# Patient Record
Sex: MALE | Race: BLACK OR AFRICAN AMERICAN | Employment: PART TIME | URBAN - METROPOLITAN AREA
[De-identification: names, ages, dates, MRNs, and addresses within clinical notes are randomized per-mention and may not be internally consistent; named-entity substitution may affect disease eponyms.]

---

## 2017-08-22 ENCOUNTER — HOSPITAL ENCOUNTER (EMERGENCY)
Age: 52
Discharge: HOME | End: 2017-08-22
Attending: EMERGENCY MEDICINE
Payer: COMMERCIAL

## 2017-08-22 ENCOUNTER — APPOINTMENT (OUTPATIENT)
Dept: GENERAL RADIOLOGY | Age: 52
End: 2017-08-22
Payer: COMMERCIAL

## 2017-08-22 VITALS
RESPIRATION RATE: 20 BRPM | WEIGHT: 234.57 LBS | TEMPERATURE: 97.8 F | SYSTOLIC BLOOD PRESSURE: 166 MMHG | DIASTOLIC BLOOD PRESSURE: 105 MMHG | HEIGHT: 73 IN | HEART RATE: 69 BPM | OXYGEN SATURATION: 99 % | BODY MASS INDEX: 31.09 KG/M2

## 2017-08-22 DIAGNOSIS — M25.571 RIGHT ANKLE PAIN, UNSPECIFIED CHRONICITY: ICD-10-CM

## 2017-08-22 DIAGNOSIS — M77.9 BONE SPUR: Primary | ICD-10-CM

## 2017-08-22 PROCEDURE — 99283 EMERGENCY DEPT VISIT LOW MDM: CPT

## 2017-08-22 PROCEDURE — 74011250637 HC RX REV CODE- 250/637: Performed by: PHYSICIAN ASSISTANT

## 2017-08-22 PROCEDURE — 73610 X-RAY EXAM OF ANKLE: CPT

## 2017-08-22 RX ORDER — OXYCODONE AND ACETAMINOPHEN 5; 325 MG/1; MG/1
1 TABLET ORAL
Status: COMPLETED | OUTPATIENT
Start: 2017-08-22 | End: 2017-08-22

## 2017-08-22 RX ORDER — HYDROCODONE BITARTRATE AND ACETAMINOPHEN 5; 325 MG/1; MG/1
1 TABLET ORAL
Qty: 12 TAB | Refills: 0 | Status: SHIPPED | OUTPATIENT
Start: 2017-08-22 | End: 2017-12-15 | Stop reason: ALTCHOICE

## 2017-08-22 RX ADMIN — OXYCODONE AND ACETAMINOPHEN 1 TABLET: 5; 325 TABLET ORAL at 19:13

## 2017-08-22 NOTE — ED NOTES
Discharge instructions reviewed with patient. Discharge instructions given to patient per Herber Clancy PA-C. Patient able to return/verbalize discharge instructions. Copy of discharge instructions provided. Patient condition stable, respiratory status within normal limits, neuro status intact. Ambulatory out of ER.

## 2017-08-22 NOTE — ED PROVIDER NOTES
HPI Comments: Ysabel Zuniga is a 47 y.o. male with PMhx significant for rotator cuff dysfunction who presents ambulatory to the ED with cc of gradually worsening right ankle pain x 4 weeks. He reports associated swelling. Pt states that he took motrin with no relief of his symptoms. He notes that he stands a lot at work which exacerbates his symptoms. Pt denies any foot pain, knee pain, CP, SOB, numbness, or weakness. Pt denies history of gout. Social history significant for: - Tobacco, - EtOH, - Illicit drug use    PCP: None    There are no other complaints, changes or physical findings at this time. Written by STEVE Khan, as dictated by Tiki King PA-C. The history is provided by the patient. No  was used. Past Medical History:   Diagnosis Date    Rotator cuff dysfunction 2013       History reviewed. No pertinent surgical history. History reviewed. No pertinent family history. Social History     Social History    Marital status:      Spouse name: N/A    Number of children: N/A    Years of education: N/A     Occupational History    Not on file. Social History Main Topics    Smoking status: Never Smoker    Smokeless tobacco: Never Used    Alcohol use No    Drug use: No    Sexual activity: Yes     Partners: Female     Other Topics Concern    Not on file     Social History Narrative         ALLERGIES: Tramadol; Ibuprofen; Keflex [cephalexin]; Morphine; and Pcn [penicillins]    Review of Systems   Constitutional: Negative. Negative for chills and fever. HENT: Negative. Negative for rhinorrhea and sore throat. Eyes: Negative. Negative for visual disturbance. Respiratory: Negative. Negative for cough, chest tightness, shortness of breath and wheezing. Cardiovascular: Negative. Negative for chest pain and palpitations. Gastrointestinal: Negative. Negative for abdominal pain, constipation, diarrhea, nausea and vomiting. Genitourinary: Negative. Negative for dysuria and hematuria. Musculoskeletal: Positive for arthralgias (right ankle) and joint swelling (right ankle). Negative for myalgias (right foot). - knee pain   Skin: Negative. Negative for rash. Allergic/Immunologic: Negative. Negative for environmental allergies and food allergies. Neurological: Negative. Negative for weakness, numbness and headaches. Psychiatric/Behavioral: Negative. Negative for suicidal ideas. Patient Vitals for the past 12 hrs:   Temp Pulse Resp BP SpO2   08/22/17 1753 97.8 °F (36.6 °C) 69 20 (!) 166/105 99 %     Physical Exam   Constitutional: He is oriented to person, place, and time. He appears well-developed and well-nourished. No distress. Pt is an AAF, awake and alert in NAD. HENT:   Head: Normocephalic and atraumatic. Right Ear: Tympanic membrane, external ear and ear canal normal.   Left Ear: Tympanic membrane, external ear and ear canal normal.   Nose: Nose normal.   Mouth/Throat: Uvula is midline, oropharynx is clear and moist and mucous membranes are normal.   Eyes: Conjunctivae and EOM are normal. Pupils are equal, round, and reactive to light. Right eye exhibits no discharge. Left eye exhibits no discharge. Neck: Normal range of motion. Cardiovascular: Normal rate, normal heart sounds and intact distal pulses. 2+ DP pulses   Pulmonary/Chest: Effort normal and breath sounds normal. No respiratory distress. He has no wheezes. He has no rales. He exhibits no tenderness. Abdominal: Soft. Bowel sounds are normal. There is no tenderness. There is no guarding. No CVA tenderness b/l. Musculoskeletal: Normal range of motion. He exhibits edema and tenderness. He exhibits no deformity. Mild edema of lateral malleolus with mild TTP. Edema appears symmetrical. No erythema or increased warmth. TYRA. No foot TTP. Neurological: He is alert and oriented to person, place, and time.  Coordination normal.   No focal neuro deficits. Skin: Skin is warm and dry. No rash noted. He is not diaphoretic. No erythema. No pallor. Psychiatric: He has a normal mood and affect. His behavior is normal.   Vitals reviewed. MDM  Number of Diagnoses or Management Options  Bone spur:   Right ankle pain, unspecified chronicity:   Diagnosis management comments:   DDx: OA, Sprain, strain, fracture, contusion, bone spur    PE findings not consistent with gout. Amount and/or Complexity of Data Reviewed  Tests in the radiology section of CPT®: ordered and reviewed  Review and summarize past medical records: yes    Patient Progress  Patient progress: stable    Procedures    Procedure Note - ACE Wrap Placement:  7:25 PM  Performed by: ROMAN Springer  Neurovascularly intact prior to tx. An ACE Wrap was placed on pt's right ankle. Joint was placed in neutral position. Neurovascularly intact after tx. The procedure took 1-15 minutes, and pt tolerated well. Written by ROMAN Springer, ED. IMAGING RESULTS:  XR ANKLE RT MIN 3 V   Final Result   EXAM:  XR ANKLE RT MIN 3 V     INDICATION:  R ankle pain.     COMPARISON: None.     FINDINGS: Three views of the right ankle demonstrate no fracture or disruption  of the ankle mortise. There is no other acute osseous or articular abnormality. The soft tissues are within normal limits. A plantar spur is noted. There is  also an exostosis projecting from the medial malleolus.     IMPRESSION  IMPRESSION: No acute abnormality. MEDICATIONS GIVEN:  Medications   oxyCODONE-acetaminophen (PERCOCET) 5-325 mg per tablet 1 Tab (1 Tab Oral Given 8/22/17 1913)       IMPRESSION:  1. Bone spur    2. Right ankle pain, unspecified chronicity        PLAN:  1. Current Discharge Medication List      CONTINUE these medications which have CHANGED    Details   HYDROcodone-acetaminophen (NORCO) 5-325 mg per tablet Take 1 Tab by mouth every six (6) hours as needed for Pain.  Max Daily Amount: 4 Tabs. Qty: 12 Tab, Refills: 0           2. Follow-up Information     Follow up With Details Comments Contact Info    Kory Esparza DPM Schedule an appointment as soon as possible for a visit in 2 days  [address] [place] [address]. [facility]  [address] [place] [address] [facility]  [phone]      [facility] EMERGENCY DEPT  As needed or, If symptoms worsen [address]   [phone] [id number] [phone]        3. RICE  Return to ED if worse     DISCHARGE NOTE  7:25 PM  The patient has been re-evaluated and is ready for discharge. Reviewed available results with patient. Counseled patient on diagnosis and care plan. Patient has expressed understanding, and all questions have been answered. Patient agrees with plan and agrees to follow up as recommended, or return to the ED if their symptoms worsen. Discharge instructions have been provided and explained to the patient, along with reasons to return to the ED. This note is prepared by Joya Booth, acting as Scribe for Gabriele Brooks PA-C. Gabriele Brooks PA-C: The scribe's documentation has been prepared under my direction and personally reviewed by me in its entirety. I confirm that the note above accurately reflects all work, treatment, procedures, and medical decision making performed by me. This note will not be viewable in [address]

## 2017-08-22 NOTE — ED NOTES
Assumed care of patient from triage. Patient reports right ankle pain x5 weeks. Patient denies any injury or trauma to extremity and states the pain \"just came out of nowhere. \" Patient states it is difficult to bear weight on extremity and has been increasingly painful since he has to stand on it all day at work. Patient is alert and oriented x4, respirations even and unlabored, vital signs stable. Pulses present in affected extremity, patient able to move extremity.

## 2017-12-14 ENCOUNTER — DOCUMENTATION ONLY (OUTPATIENT)
Dept: INTERNAL MEDICINE CLINIC | Age: 52
End: 2017-12-14

## 2017-12-14 NOTE — PROGRESS NOTES
Spoke With Vasu Camejo From [facility] [employer] in ref to insurance  not matching what we have on file, stated that they had 4/3/66 on file req that we fsax a copy of pts id card over that shows ,adv that card we have on file was ,due to pt never getting a renewed copy to us,rep stated that was ok to fax over the  copy stated that she would send it over to the member services dept as an urgent req to have the  changed before the pts appt on 12/15    Contact info: DELLA Phone: [phone] ext.  [phone] Fax: [phone]

## 2017-12-15 ENCOUNTER — OFFICE VISIT (OUTPATIENT)
Dept: INTERNAL MEDICINE CLINIC | Age: 52
End: 2017-12-15

## 2017-12-15 VITALS
BODY MASS INDEX: 30.48 KG/M2 | DIASTOLIC BLOOD PRESSURE: 88 MMHG | TEMPERATURE: 97.5 F | HEART RATE: 62 BPM | HEIGHT: 73 IN | OXYGEN SATURATION: 100 % | SYSTOLIC BLOOD PRESSURE: 148 MMHG | WEIGHT: 230 LBS | RESPIRATION RATE: 16 BRPM

## 2017-12-15 DIAGNOSIS — M54.5 CHRONIC LOW BACK PAIN, UNSPECIFIED BACK PAIN LATERALITY, WITH SCIATICA PRESENCE UNSPECIFIED: ICD-10-CM

## 2017-12-15 DIAGNOSIS — M54.40 ACUTE LEFT-SIDED LOW BACK PAIN WITH SCIATICA, SCIATICA LATERALITY UNSPECIFIED: Primary | ICD-10-CM

## 2017-12-15 DIAGNOSIS — G89.29 CHRONIC LOW BACK PAIN, UNSPECIFIED BACK PAIN LATERALITY, WITH SCIATICA PRESENCE UNSPECIFIED: ICD-10-CM

## 2017-12-15 LAB
BILIRUB UR QL STRIP: NEGATIVE
GLUCOSE UR-MCNC: NEGATIVE MG/DL
KETONES P FAST UR STRIP-MCNC: NEGATIVE MG/DL
PH UR STRIP: 5.5 [PH] (ref 4.6–8)
PROT UR QL STRIP: NEGATIVE
SP GR UR STRIP: 1.02 (ref 1–1.03)
UA UROBILINOGEN AMB POC: NORMAL (ref 0.2–1)
URINALYSIS CLARITY POC: CLEAR
URINALYSIS COLOR POC: NORMAL
URINE BLOOD POC: NORMAL
URINE LEUKOCYTES POC: NEGATIVE
URINE NITRITES POC: NEGATIVE

## 2017-12-15 RX ORDER — OXYCODONE AND ACETAMINOPHEN 5; 325 MG/1; MG/1
1 TABLET ORAL
Qty: 20 TAB | Refills: 0 | Status: SHIPPED | OUTPATIENT
Start: 2017-12-15 | End: 2018-03-30 | Stop reason: SDUPTHER

## 2017-12-15 RX ORDER — PREDNISONE 10 MG/1
TABLET ORAL
Qty: 20 TAB | Refills: 0 | Status: SHIPPED | OUTPATIENT
Start: 2017-12-15 | End: 2018-02-22 | Stop reason: ALTCHOICE

## 2017-12-15 NOTE — LETTER
NOTIFICATION RETURN TO WORK / SCHOOL 
 
12/15/2017 3:19 PM 
 
Mr. Batool Avalos [address] [address] To Whom It May Concern: 
 
Batool Avalos is currently under the care of [address] He will return to work/school on: 12/20/17 If there are questions or concerns please have the patient contact our office. Sincerely, Alex Pepper MD

## 2017-12-15 NOTE — PROGRESS NOTES
Chief Complaint   Patient presents with    Side Pain     pain L/side and travels down L/leg     I have reviewed the patient's medical history in detail and updated the computerized patient record. Health Maintenance reviewed. 1. Have you been to the ER, urgent care clinic since your last visit? Hospitalized since your last visit? yes    2. Have you seen or consulted any other health care providers outside of the [address] [place] [address] since your last visit? Include any pap smears or colon screening.  Yes    Encouraged pt to discuss pt's wishes with spouse/partner/family and bring them in the next appt to follow thru with the Advanced Directive

## 2017-12-15 NOTE — MR AVS SNAPSHOT
Visit Information Date & Time Provider Department Dept. Phone Encounter #  
 12/15/2017  2:00 PM Jose Antonio Morton MD [place] [address] [phone] [id number] Follow-up Instructions Return in about 4 days (around 12/19/2017) for routine follow up. Upcoming Health Maintenance Date Due DTaP/Tdap/Td series (1 - Tdap) 4/10/1987 FOBT Q 1 YEAR AGE 50-75 4/10/2016 Influenza Age 5 to Adult 8/1/2017 Allergies as of 12/15/2017  Review Complete On: 12/15/2017 By: Jose Antonio Morton MD  
  
 Severity Noted Reaction Type Reactions Tramadol High 01/20/2016    Anaphylaxis Ibuprofen  04/27/2014    Nausea Only Keflex [Cephalexin]  01/20/2016    Itching Morphine  04/27/2014    Hives Pcn [Penicillins]  01/20/2016    Angioedema Current Immunizations  Never Reviewed No immunizations on file. Not reviewed this visit You Were Diagnosed With   
  
 Codes Comments Acute left-sided low back pain with sciatica, sciatica laterality unspecified    -  Primary ICD-10-CM: M54.40 ICD-9-CM: 724.2, 724.3 Vitals BP Pulse Temp Resp Height(growth percentile) Weight(growth percentile) 148/88 (BP 1 Location: Left arm, BP Patient Position: Sitting) 62 97.5 °F (36.4 °C) (Oral) 16 6' 1\" (1.854 m) 230 lb (104.3 kg) SpO2 BMI Smoking Status 100% 30.34 kg/m2 Never Smoker Vitals History BMI and BSA Data Body Mass Index Body Surface Area  
 30.34 kg/m 2 2.32 m 2 Preferred Pharmacy Pharmacy Name Phone [facility], [address] [place] [address] [phone] Your Updated Medication List  
  
   
This list is accurate as of: 12/15/17  3:17 PM.  Always use your most recent med list.  
  
  
  
  
 oxyCODONE-acetaminophen 5-325 mg per tablet Commonly known as:  PERCOCET Take 1 Tab by mouth every eight (8) hours as needed for Pain. Max Daily Amount: 3 Tabs. predniSONE 10 mg tablet Commonly known as:  DELTASONE  
4 tabs for 2 day, 3 tabs for 2 days, 2 tabs for 2 days, 1tab for 2 days Prescriptions Printed Refills  
 oxyCODONE-acetaminophen (PERCOCET) 5-325 mg per tablet 0 Sig: Take 1 Tab by mouth every eight (8) hours as needed for Pain. Max Daily Amount: 3 Tabs. Class: Print Route: Oral  
  
Prescriptions Sent to Pharmacy Refills  
 predniSONE (DELTASONE) 10 mg tablet 0 Si tabs for 2 day, 3 tabs for 2 days, 2 tabs for 2 days, 1tab for 2 days Class: Normal  
 Pharmacy: [address], [address] [place] [address] #: [phone] We Performed the Following AMB POC URINALYSIS DIP STICK AUTO W/O MICRO [81273 CPT(R)] Follow-up Instructions Return in about 4 days (around 2017) for routine follow up. Introducing [facility] & HEALTH SERVICES! Liz Sanchez introduces [employer] patient portal. Now you can access parts of your medical record, email your doctor's office, and request medication refills online. 1. In your internet browser, go to [email or web address] 2. Click on the First Time User? Click Here link in the Sign In box. You will see the New Member Sign Up page. 3. Enter your Evocha Access Code exactly as it appears below. You will not need to use this code after youve completed the sign-up process. If you do not sign up before the expiration date, you must request a new code. · Ev[employer] Access Code: [email or web address] Expires: 3/15/2018  2:07 PM 
 
4. Enter the last four digits of your Social Security Number (xxxx) and Date of Birth (mm/dd/yyyy) as indicated and click Submit. You will be taken to the next sign-up page. 5. Create a [email or web address] ID. This will be your [email or web address] login ID and cannot be changed, so think of one that is secure and easy to remember. 6. Create a [email or web address] password. You can change your password at any time. 7. Enter your Password Reset Question and Answer. This can be used at a later time if you forget your password. 8. Enter your e-mail address. You will receive e-mail notification when new information is available in [address] 9. Click Sign Up. You can now view and download portions of your medical record. 10. Click the Download Summary menu link to download a portable copy of your medical information. If you have questions, please visit the Frequently Asked Questions section of the [facility] website. Remember, [facility] is NOT to be used for urgent needs. For medical emergencies, dial 911. Now available from your iPhone and Android! Please provide this summary of care documentation to your next provider. Your primary care clinician is listed as Franky Marcano. If you have any questions after today's visit, please call [phone].

## 2017-12-16 NOTE — PROGRESS NOTES
HISTORY OF PRESENT ILLNESS  Vito Collazo is a 47 y.o. male. Side Pain   The history is provided by the patient. This is a recurrent problem. The current episode started more than 1 week ago. The problem occurs constantly. The problem has been gradually worsening. Associated symptoms comments: Severe left flank pain. The symptoms are aggravated by bending. Nothing relieves the symptoms. Treatments tried: Has been to the emergency room twice, had workup has not shown a cause for  is pain was given some hydrocodone, and some muscle relaxers. The treatment provided no relief. Was told to follow-up here PCP, been seeing . Urine compliance done last year showed no illicit narcotics/drugs. Within the emergency room, had CT scan abdomen pelvis, no evidence of kidney stones present. Negative exam.  Labs from today were reviewed  yes, labs done previously were reviewed  yes, Labs done in ER were reviewed  yes, Additional labs are ordered  no,    Past Medical History:   Diagnosis Date    Depression     Headache     Rotator cuff dysfunction 2013     Social History     Social History    Marital status:      Spouse name: N/A    Number of children: 3    Years of education: N/A     Occupational History    break shoes      Social History Main Topics    Smoking status: Never Smoker    Smokeless tobacco: Never Used    Alcohol use No    Drug use: No    Sexual activity: Yes     Partners: Female     Other Topics Concern    Not on file     Social History Narrative    Living with wife and 3 kids     Allergies   Allergen Reactions    Tramadol Anaphylaxis    Ibuprofen Nausea Only    Keflex [Cephalexin] Itching    Morphine Hives    Pcn [Penicillins] Angioedema       Review of Systems   Constitutional: Negative for fever and weight loss. Gastrointestinal: Negative for blood in stool. No incontinence   Genitourinary: Positive for flank pain. Negative for dysuria and hematuria. Physical Exam  Visit Vitals    /88 (BP 1 Location: Left arm, BP Patient Position: Sitting)    Pulse 62    Temp 97.5 °F (36.4 °C) (Oral)    Resp 16    Ht 6' 1\" (1.854 m)    Wt 230 lb (104.3 kg)    SpO2 100%    BMI 30.34 kg/m2     WD WN male uncomfortable and in some pain. Heart RRR without murmers clicks or rubs  Lungs CTA  Abdo soft nontender  Ext no edema  Back was examined, grossly normal, no lesions or rash. Saddle area, sensation present. Patellar reflexes 2+ equal bilaterally. Lower leg strength 5 out of 5 bilateral.  Straight leg raises are equivocal    ASSESSMENT and PLAN      ICD-10-CM ICD-9-CM    1. Acute left-sided low back pain with sciatica, sciatica laterality unspecified M54.40 724.2 AMB POC URINALYSIS DIP STICK AUTO W/O MICRO     724.3    2. Chronic low back pain, unspecified back pain laterality, with sciatica presence unspecified M54.5 724.2     G89.29 338.29        Orders Placed This Encounter    AMB POC URINALYSIS DIP STICK AUTO W/O MICRO    predniSONE (DELTASONE) 10 mg tablet    oxyCODONE-acetaminophen (PERCOCET) 5-325 mg per tablet       We discussed this pain and the usage of controlled substances for acute back relief. In general these medications are indicated for the acute symptom relief and not for chronic usage, allthough they are frequently used for chronic management  After a certain period of time they should be stopped to avoid dependence and/or addiction. I warned him about the dangers of addiction and other side affects including respiratory depression and death. Discussed how this is a controlled substance and that the prescribing of it is should be monitored very carefully. Drug usage is also monitored by our practise and the [facility], since there has been a lot of abuse and diversion of controlled substances. In general we do not refill this medication over the phone.  PLease ask for enough pills to get you to your next appointment and make sure you keep your appointments. Warned not to take other medications like alcohol, other benzodiazapines marijuana or other narcotics when on this medication. Discussed the nature of back pain. Discussed how this is in general a 'red flag' diagnosis and the general limited and temporary nature of this problem as well as its re-occurrence. Discussed further work-up and treatment options. Discused narcotics and back pain: yes, one time only treatment with oxycodone the the prednisone will quiet this down and he will not need further narcotics. If symptoms continue he may need to get into see orthopedics  Follow-up Disposition:  Return in about 4 days (around 12/19/2017) for routine follow up.

## 2017-12-19 ENCOUNTER — OFFICE VISIT (OUTPATIENT)
Dept: INTERNAL MEDICINE CLINIC | Age: 52
End: 2017-12-19

## 2017-12-19 VITALS
DIASTOLIC BLOOD PRESSURE: 90 MMHG | TEMPERATURE: 98.4 F | SYSTOLIC BLOOD PRESSURE: 134 MMHG | OXYGEN SATURATION: 100 % | HEART RATE: 66 BPM | BODY MASS INDEX: 30.48 KG/M2 | WEIGHT: 230 LBS | RESPIRATION RATE: 16 BRPM | HEIGHT: 73 IN

## 2017-12-19 DIAGNOSIS — M54.40 ACUTE LEFT-SIDED LOW BACK PAIN WITH SCIATICA, SCIATICA LATERALITY UNSPECIFIED: Primary | ICD-10-CM

## 2017-12-19 DIAGNOSIS — R35.0 URINE FREQUENCY: ICD-10-CM

## 2017-12-19 PROBLEM — M54.32 SCIATICA OF LEFT SIDE: Status: ACTIVE | Noted: 2017-12-19

## 2017-12-19 RX ORDER — GABAPENTIN 100 MG/1
100 CAPSULE ORAL 3 TIMES DAILY
Qty: 90 CAP | Refills: 2 | Status: SHIPPED | OUTPATIENT
Start: 2017-12-19 | End: 2018-02-22 | Stop reason: SDUPTHER

## 2017-12-19 NOTE — MR AVS SNAPSHOT
Visit Information Date & Time Provider Department Dept. Phone Encounter #  
 12/19/2017  1:15 PM Vania Major  [facility] [id number] Follow-up Instructions Return in about 6 weeks (around 1/30/2018) for routine follow up. Upcoming Health Maintenance Date Due DTaP/Tdap/Td series (1 - Tdap) 4/10/1987 FOBT Q 1 YEAR AGE 50-75 4/10/2016 Influenza Age 5 to Adult 8/1/2017 Allergies as of 12/19/2017  Review Complete On: 12/19/2017 By: Vania Major MD  
  
 Severity Noted Reaction Type Reactions Tramadol High 01/20/2016    Anaphylaxis Ibuprofen  04/27/2014    Nausea Only Keflex [Cephalexin]  01/20/2016    Itching Morphine  04/27/2014    Hives Pcn [Penicillins]  01/20/2016    Angioedema Current Immunizations  Never Reviewed No immunizations on file. Not reviewed this visit You Were Diagnosed With   
  
 Codes Comments Acute left-sided low back pain with sciatica, sciatica laterality unspecified    -  Primary ICD-10-CM: M54.40 ICD-9-CM: 724.2, 724.3 Urine frequency     ICD-10-CM: R35.0 ICD-9-CM: 788.41 Vitals BP Pulse Temp Resp Height(growth percentile) Weight(growth percentile) 134/90 (BP 1 Location: Left arm, BP Patient Position: Sitting) 66 98.4 °F (36.9 °C) (Oral) 16 6' 1\" (1.854 m) 230 lb (104.3 kg) SpO2 BMI Smoking Status 100% 30.34 kg/m2 Never Smoker Vitals History BMI and BSA Data Body Mass Index Body Surface Area  
 30.34 kg/m 2 2.32 m 2 Preferred Pharmacy Pharmacy Name Phone [facility], [address] [facility] [phone] Your Updated Medication List  
  
   
This list is accurate as of: 12/19/17  1:53 PM.  Always use your most recent med list.  
  
  
  
  
 gabapentin 100 mg capsule Commonly known as:  NEURONTIN Take 1 Cap by mouth three (3) times daily.  Start 1 tablet daily, usually in the evening, every few days increase as tolerated, for pain  
  
 oxyCODONE-acetaminophen 5-325 mg per tablet Commonly known as:  PERCOCET Take 1 Tab by mouth every eight (8) hours as needed for Pain. Max Daily Amount: 3 Tabs. predniSONE 10 mg tablet Commonly known as:  DELTASONE  
4 tabs for 2 day, 3 tabs for 2 days, 2 tabs for 2 days, 1tab for 2 days Prescriptions Sent to Pharmacy Refills  
 gabapentin (NEURONTIN) 100 mg capsule 2 Sig: Take 1 Cap by mouth three (3) times daily. Start 1 tablet daily, usually in the evening, every few days increase as tolerated, for pain  
 Class: Normal  
 Pharmacy: [id number], [id number] [facility] Ph #: [phone] Route: Oral  
  
We Performed the Following AMB POC URINALYSIS DIP STICK AUTO W/O MICRO [03245 CPT(R)] REFERRAL TO ORTHOPEDICS [AVI641 Custom] Comments:  
 Acute severe left side back pain, Been to the ER Ct scans neg, pain cont severe. no injury Follow-up Instructions Return in about 6 weeks (around 1/30/2018) for routine follow up. Referral Information Referral ID Referred By Referred To  
  
 [id number] [address], [facility] [address] [place] Phone: [phone] Visits Status Start Date End Date 1 New Request 12/19/17 12/19/18 If your referral has a status of pending review or denied, additional information will be sent to support the outcome of this decision. Introducing [facility] & HEALTH SERVICES! Hortencia Rivera introduces [employer]Me[employer] patient portal. Now you can access parts of your medical record, email your doctor's office, and request medication refills online. 1. In your internet browser, go to [email or web address] 2. Click on the First Time User? Click Here link in the Sign In box. You will see the New Member Sign Up page. 3. Enter your W[employer] Access Code exactly as it appears below. You will not need to use this code after youve completed the sign-up process. If you do not sign up before the expiration date, you must request a new code. · W[employer] Access Code: [email or web address] Expires: 3/15/2018  2:07 PM 
 
4. Enter the last four digits of your Social Security Number (xxxx) and Date of Birth (mm/dd/yyyy) as indicated and click Submit. You will be taken to the next sign-up page. 5. Create a W[employer]is ID. This will be your [employer] login ID and cannot be changed, so think of one that is secure and easy to remember. 6. Create a W[employer]is password. You can change your password at any time. 7. Enter your Password Reset Question and Answer. This can be used at a later time if you forget your password. 8. Enter your e-mail address. You will receive e-mail notification when new information is available in [address] 9. Click Sign Up. You can now view and download portions of your medical record. 10. Click the Download Summary menu link to download a portable copy of your medical information. If you have questions, please visit the Frequently Asked Questions section of the [employer] website. Remember, [employer] is NOT to be used for urgent needs. For medical emergencies, dial 911. Now available from your iPhone and Android! Please provide this summary of care documentation to your next provider. Your primary care clinician is listed as Doroteo Marvin. If you have any questions after today's visit, please call [phone].

## 2017-12-19 NOTE — PROGRESS NOTES
Chief Complaint   Patient presents with    Side Pain     L/side     I have reviewed the patient's medical history in detail and updated the computerized patient record. Health Maintenance reviewed. 1. Have you been to the ER, urgent care clinic since your last visit? Hospitalized since your last visit?no    2. Have you seen or consulted any other health care providers outside of the [address] [place] [address] since your last visit? Include any pap smears or colon screening.  No    Encouraged pt to discuss pt's wishes with spouse/partner/family and bring them in the next appt to follow thru with the Advanced Directive

## 2017-12-19 NOTE — LETTER
NOTIFICATION OF RETURN TO WORK / SCHOOL 
 
12/19/2017 1:55 PM 
 
Mr. Salvador Domínguez [facility] [address] [facility] [place] [address] [address] To Whom It May Concern: 
 
Salvador Domínguez was under the care of [address]. He will be able to return to work on January 3, 2018 on light duty lifting no more than 25 lbs. If there are questions or concerns please have the patient contact our office. Sincerely, Lexie Gupta MD

## 2017-12-19 NOTE — PROGRESS NOTES
HISTORY OF PRESENT ILLNESS  Naheed Lanza is a 47 y.o. male. Side Pain   The history is provided by the patient. This is a new problem. The current episode started more than 1 week ago. The problem occurs constantly. The problem has not changed since onset. Pertinent negatives include no chest pain and no abdominal pain. Treatments tried: perc. The treatment provided mild relief. No injury, CT Abdo/pelvis scans in ER x 2, said OK, No known CA. 9/10 pain levels, 6 perc left. Still taking prednisone  Had some urine frequency the other day no dysuria. Labs from today were reviewed  yes, labs done previously were reviewed  yes, Labs done in ER were reviewed  yes, Additional labs are ordered  no,    Patient Active Problem List   Diagnosis Code    Sciatica of left side M54.32     Social History     Social History    Marital status:      Spouse name: N/A    Number of children: 3    Years of education: N/A     Occupational History    break shoes      Social History Main Topics    Smoking status: Never Smoker    Smokeless tobacco: Never Used    Alcohol use No    Drug use: No    Sexual activity: Yes     Partners: Female     Other Topics Concern    Not on file     Social History Narrative    Living with wife and 3 kids         Review of Systems   Constitutional: Negative for weight loss. Cardiovascular: Negative for chest pain. Gastrointestinal: Negative for abdominal pain. No incontinence       Physical Exam  Visit Vitals    /90 (BP 1 Location: Left arm, BP Patient Position: Sitting)    Pulse 66    Temp 98.4 °F (36.9 °C) (Oral)    Resp 16    Ht 6' 1\" (1.854 m)    Wt 230 lb (104.3 kg)    SpO2 100%    BMI 30.34 kg/m2     WD WN male NAD  Heart RRR without murmers clicks or rubs  Lungs CTA  Abdo soft nontender  Ext no edema  Back grossly nl  ASSESSMENT and PLAN  Encounter Diagnoses   Name Primary?     Acute left-sided low back pain with sciatica, sciatica laterality unspecified Yes    Urine frequency      Orders Placed This Encounter    REFERRAL TO ORTHOPEDICS    AMB POC URINALYSIS DIP STICK AUTO W/O MICRO    gabapentin (NEURONTIN) 100 mg capsule     Discussed possible side affects, precautions, and drug interactions and possible benefits of the medication(s). Current Outpatient Prescriptions   Medication Sig Dispense Refill    gabapentin (NEURONTIN) 100 mg capsule Take 1 Cap by mouth three (3) times daily. Start 1 tablet daily, usually in the evening, every few days increase as tolerated, for pain 90 Cap 2    predniSONE (DELTASONE) 10 mg tablet 4 tabs for 2 day, 3 tabs for 2 days, 2 tabs for 2 days, 1tab for 2 days 20 Tab 0    oxyCODONE-acetaminophen (PERCOCET) 5-325 mg per tablet Take 1 Tab by mouth every eight (8) hours as needed for Pain. Max Daily Amount: 3 Tabs. 20 Tab 0     Discussed the nature of back pain. Discussed how this is in general a 'red flag' diagnosis and the general limited and temporary nature of this problem as well as its re-occurrence. Discussed further work-up and treatment options. Discused narcotics and back pain: yes, no further rf, finish current Rx change to gabapentin. Follow-up Disposition:  Return in about 6 weeks (around 1/30/2018) for routine follow up.

## 2017-12-21 ENCOUNTER — TELEPHONE (OUTPATIENT)
Dept: INTERNAL MEDICINE CLINIC | Age: 52
End: 2017-12-21

## 2017-12-21 NOTE — TELEPHONE ENCOUNTER
Pt said that medication that was given to him on Tuesday is not helping his side. Please call him at [phone].

## 2017-12-21 NOTE — TELEPHONE ENCOUNTER
Called patient, he states that there pain in his left side is not better at all with gabapentin - took all of the Percocets he had - rates the pain at a 9 on a 0-10 scale - appointment given to see Sandra Betancourt tomorrow (12/22/) to reevaluate this pain  Moraima Logan LPN  41/32/3179  76:30 PM

## 2017-12-26 ENCOUNTER — TELEPHONE (OUTPATIENT)
Dept: INTERNAL MEDICINE CLINIC | Age: 52
End: 2017-12-26

## 2018-02-22 ENCOUNTER — OFFICE VISIT (OUTPATIENT)
Dept: INTERNAL MEDICINE CLINIC | Age: 53
End: 2018-02-22

## 2018-02-22 VITALS
BODY MASS INDEX: 31.3 KG/M2 | TEMPERATURE: 97 F | HEART RATE: 62 BPM | HEIGHT: 73 IN | WEIGHT: 236.2 LBS | OXYGEN SATURATION: 99 % | SYSTOLIC BLOOD PRESSURE: 133 MMHG | DIASTOLIC BLOOD PRESSURE: 80 MMHG | RESPIRATION RATE: 18 BRPM

## 2018-02-22 DIAGNOSIS — M54.41 CHRONIC MIDLINE LOW BACK PAIN WITH RIGHT-SIDED SCIATICA: Primary | ICD-10-CM

## 2018-02-22 DIAGNOSIS — G89.29 CHRONIC MIDLINE LOW BACK PAIN WITH RIGHT-SIDED SCIATICA: Primary | ICD-10-CM

## 2018-02-22 RX ORDER — LISINOPRIL AND HYDROCHLOROTHIAZIDE 10; 12.5 MG/1; MG/1
1 TABLET ORAL
COMMUNITY
Start: 2017-12-13 | End: 2018-03-30 | Stop reason: ALTCHOICE

## 2018-02-22 RX ORDER — GABAPENTIN 300 MG/1
300 CAPSULE ORAL 2 TIMES DAILY
Qty: 60 CAP | Refills: 2 | Status: SHIPPED | OUTPATIENT
Start: 2018-02-22 | End: 2018-03-30 | Stop reason: ALTCHOICE

## 2018-02-22 RX ORDER — ACETAMINOPHEN AND CODEINE PHOSPHATE 300; 30 MG/1; MG/1
1 TABLET ORAL
Qty: 42 TAB | Refills: 1 | Status: SHIPPED | OUTPATIENT
Start: 2018-02-22 | End: 2018-03-30 | Stop reason: ALTCHOICE

## 2018-02-22 NOTE — PROGRESS NOTES
Chief Complaint   Patient presents with    LOW BACK PAIN     PATIENT STATES THAT IT HAS NOT RESOLVED SINCE LAST VISIT WITH DR Rony Adkins 12.19.2017    Leg Pain     RIGHT SIDE ONLY;  DIFFICULTY WALKING AT TIMES     1. Have you been to the ER, urgent care clinic since your last visit? Hospitalized since your last visit? No    2. Have you seen or consulted any other health care providers outside of the [address] [place] [address] since your last visit? Include any pap smears or colon screening. No     Health Maintenance Due   Topic Date Due    DTaP/Tdap/Td series (1 - Tdap) 04/10/1987    FOBT Q 1 YEAR AGE 50-75  04/10/2016    Influenza Age 5 to Adult  08/01/2017     Patient refuses flu vaccine. Do you have an [address] in place in the event that you have a healthcare crisis that could impact your decision making as it pertains to your health? NO    Would you like information about Advance Care Planning? NO    Information given.  NO

## 2018-02-22 NOTE — PROGRESS NOTES
HISTORY OF PRESENT ILLNESS  Linda Orozco is a 47 y.o. male. HPI  Chief Complaint   Patient presents with    LOW BACK PAIN     PATIENT STATES THAT IT HAS NOT RESOLVED SINCE LAST VISIT WITH DR Juan Jose Ontiveros 12.19.2017    Leg Pain     RIGHT SIDE ONLY;  DIFFICULTY WALKING AT TIMES     Rates pain 8/10 today. States only took gabapentin for 1 month and did not get refill. States is using heat therapy. MR review indicates Imaging 2016 lumbar spine positive for mild spondylosis. Patient states has appointment with Ortho scheduled for April. Discussed with patient conservative treatment for low back pain. Review of Systems   Constitutional: Negative. Negative for chills and fever. Eyes: Negative. Respiratory: Negative. Cardiovascular: Negative. Negative for chest pain and leg swelling. Gastrointestinal: Negative. Genitourinary: Negative. Musculoskeletal: Positive for back pain. Skin: Negative. Physical Exam   Constitutional: He is oriented to person, place, and time. He appears well-developed and well-nourished. No distress. HENT:   Head: Normocephalic and atraumatic. Eyes: Conjunctivae are normal.   Cardiovascular: Normal rate, regular rhythm, normal heart sounds and intact distal pulses. Exam reveals no gallop and no friction rub. No murmur heard. Pulmonary/Chest: Effort normal and breath sounds normal. No respiratory distress. He has no wheezes. Musculoskeletal: Normal range of motion. He exhibits no edema, tenderness or deformity. Neurological: He is alert and oriented to person, place, and time. Skin: Skin is warm and dry. He is not diaphoretic. Nursing note and vitals reviewed. Plan of care and AVS reviewed with patient who verbalized understanding. ASSESSMENT and PLAN    ICD-10-CM ICD-9-CM    1.  Chronic midline low back pain with right-sided sciatica M54.41 724.2 gabapentin (NEURONTIN) 300 mg capsule    G89.29 724.3 REFERRAL TO PHYSICAL THERAPY     338.29 acetaminophen-codeine (TYLENOL #3) 300-30 mg per tablet   Patient to schedule with PT  Renewed gabapentin. .  Started Tylenol #3  To follow up with Dr. Jeet Hernandez for stronger pain medication if needed when PT starts.

## 2018-02-22 NOTE — MR AVS SNAPSHOT
[address] [place] [id number] [place] [address] 
[phone] Patient: Manson Schlatter MRN: [id number] :1965 Visit Information Date & Time Provider Department Dept. Phone Encounter #  
 2018  1:45 PM Edwin Ricci, 1 [facility] [phone] Upcoming Health Maintenance Date Due DTaP/Tdap/Td series (1 - Tdap) 4/10/1987 FOBT Q 1 YEAR AGE 50-75 4/10/2016 Influenza Age 5 to Adult 2017 Allergies as of 2018  Review Complete On: 2018 By: Edwin Ricci NP Severity Noted Reaction Type Reactions Tramadol High 2016    Anaphylaxis Ibuprofen  2014    Nausea Only Keflex [Cephalexin]  2016    Itching Morphine  2014    Hives Pcn [Penicillins]  2016    Angioedema Current Immunizations  Never Reviewed No immunizations on file. Not reviewed this visit You Were Diagnosed With   
  
 Codes Comments Chronic midline low back pain with right-sided sciatica    -  Primary ICD-10-CM: M54.41, G89.29 ICD-9-CM: 724.2, 724.3, 338.29 Vitals BP Pulse Temp Resp Height(growth percentile) Weight(growth percentile) 133/80 (BP 1 Location: Left arm, BP Patient Position: Sitting) 62 97 °F (36.1 °C) (Temporal) 18 6' 1\" (1.854 m) 236 lb 3.2 oz (107.1 kg) SpO2 BMI Smoking Status 99% 31.16 kg/m2 Never Smoker Vitals History BMI and BSA Data Body Mass Index Body Surface Area  
 31.16 kg/m 2 2.35 m 2 Preferred Pharmacy Pharmacy Name Phone [facility], [facility] [phone] Your Updated Medication List  
  
   
This list is accurate as of 18  2:03 PM.  Always use your most recent med list.  
  
  
  
  
 acetaminophen-codeine 300-30 mg per tablet Commonly known as:  TYLENOL #3  
 Take 1 Tab by mouth every four (4) hours as needed for Pain. Max Daily Amount: 6 Tabs.  
  
 gabapentin 300 mg capsule Commonly known as:  NEURONTIN Take 1 Cap by mouth two (2) times a day. Start 1 tablet daily, usually in the evening, every few days increase as tolerated, for pain  
  
 lisinopril-hydroCHLOROthiazide 10-12.5 mg per tablet Commonly known as:  Melvena Dundy Take 1 Tab by mouth. oxyCODONE-acetaminophen 5-325 mg per tablet Commonly known as:  PERCOCET Take 1 Tab by mouth every eight (8) hours as needed for Pain. Max Daily Amount: 3 Tabs. Prescriptions Printed Refills  
 acetaminophen-codeine (TYLENOL #3) 300-30 mg per tablet 1 Sig: Take 1 Tab by mouth every four (4) hours as needed for Pain. Max Daily Amount: 6 Tabs. Class: Print Route: Oral  
  
Prescriptions Sent to Pharmacy Refills  
 gabapentin (NEURONTIN) 300 mg capsule 2 Sig: Take 1 Cap by mouth two (2) times a day. Start 1 tablet daily, usually in the evening, every few days increase as tolerated, for pain  
 Class: Normal  
 [facility] [address] [facility] Ph #: [phone] Route: Oral  
  
We Performed the Following REFERRAL TO PHYSICAL THERAPY [YKK50 Custom] Comments:  
 Please evaluate and treat patient for low back pain and sciatica down right leg. [facility] Ina Tan [phone] Referral Information Referral ID Referred By Referred To  
  
 [id number] Coral PEREZ Not Available Visits Status Start Date End Date 1 New Request 2/22/18 2/22/19 If your referral has a status of pending review or denied, additional information will be sent to support the outcome of this decision. Introducing [facility]! Franklyn Singh introduces [employer] patient portal. Now you can access parts of your medical record, email your doctor's office, and request medication refills online. 1. In your internet browser, go to [email or web address] city [email or web address]the [employer] 2. Click on the First Time User? Click Here link in the Sign In box. You will see the New Member Sign Up page. 3. Enter your [email or web address] Access Code exactly as it appears below. You will not need to use this code after youve completed the sign-up process. If you do not sign up before the expiration date, you must request a new code. · [email or web address] Access Code: [email or web address] Expires: 3/15/2018  2:07 PM 
 
4. Enter the last four digits of your Social Security Number (xxxx) and Date of Birth (mm/dd/yyyy) as indicated and click Submit. You will be taken to the next sign-up page. 5. Create a [employer] ID. This will be your [employer] login ID and cannot be changed, so think of one that is secure and easy to remember. 6. Create a [employer] password. You can change your password at any time. 7. Enter your Password Reset Question and Answer. This can be used at a later time if you forget your password. 8. Enter your e-mail address. You will receive e-mail notification when new information is available in [address] 9. Click Sign Up. You can now view and download portions of your medical record. 10. Click the Download Summary menu link to download a portable copy of your medical information. If you have questions, please visit the Frequently Asked Questions section of the [email or web address] website. Remember, [email or web address] is NOT to be used for urgent needs. For medical emergencies, dial 911. Now available from your iPhone and Android! Please provide this summary of care documentation to your next provider. Your primary care clinician is listed as Wolf Christensen. If you have any questions after today's visit, please call [phone].

## 2018-02-22 NOTE — LETTER
NOTIFICATION RETURN TO WORK / SCHOOL 
 
2/22/2018 2:07 PM 
 
Mr. Richie Urbano [facility] [address] [facility] [address] To Whom It May Concern: 
 
Richie Urbano is currently under the care of [address]. He will return to work/school on: 2/23/2018 If there are questions or concerns please have the patient contact our office.  
 
 
 
Sincerely, 
 
 
Gianni Hidalgo NP

## 2018-02-27 NOTE — PATIENT INSTRUCTIONS
Reviewed use of medications and other treatment modalities for back pain  Encouraged to make appointment with PT

## 2018-02-28 ENCOUNTER — TELEPHONE (OUTPATIENT)
Dept: INTERNAL MEDICINE CLINIC | Age: 53
End: 2018-02-28

## 2018-02-28 NOTE — TELEPHONE ENCOUNTER
Pts daughter called to set up an appt for her dad. She said he thinks he has a whole in his stomach. I asked her if he was able to speak so that a nurse could triage him. She said he was at work and can be reached on his cell phone. Please call him at [phone].

## 2018-03-30 ENCOUNTER — DOCUMENTATION ONLY (OUTPATIENT)
Dept: INTERNAL MEDICINE CLINIC | Age: 53
End: 2018-03-30

## 2018-03-30 ENCOUNTER — OFFICE VISIT (OUTPATIENT)
Dept: INTERNAL MEDICINE CLINIC | Age: 53
End: 2018-03-30

## 2018-03-30 VITALS
SYSTOLIC BLOOD PRESSURE: 130 MMHG | DIASTOLIC BLOOD PRESSURE: 89 MMHG | RESPIRATION RATE: 16 BRPM | HEIGHT: 73 IN | BODY MASS INDEX: 30.48 KG/M2 | HEART RATE: 65 BPM | TEMPERATURE: 98.3 F | OXYGEN SATURATION: 100 % | WEIGHT: 230 LBS

## 2018-03-30 DIAGNOSIS — M54.41 RIGHT-SIDED LOW BACK PAIN WITH RIGHT-SIDED SCIATICA, UNSPECIFIED CHRONICITY: Primary | ICD-10-CM

## 2018-03-30 PROBLEM — M54.32 SCIATICA OF LEFT SIDE: Status: RESOLVED | Noted: 2017-12-19 | Resolved: 2018-03-30

## 2018-03-30 RX ORDER — PREDNISONE 10 MG/1
TABLET ORAL
Qty: 20 TAB | Refills: 0 | Status: SHIPPED | OUTPATIENT
Start: 2018-03-30 | End: 2018-05-11 | Stop reason: ALTCHOICE

## 2018-03-30 RX ORDER — OXYCODONE AND ACETAMINOPHEN 5; 325 MG/1; MG/1
1 TABLET ORAL
Qty: 20 TAB | Refills: 0 | Status: SHIPPED | OUTPATIENT
Start: 2018-03-30 | End: 2018-05-11 | Stop reason: ALTCHOICE

## 2018-03-30 NOTE — PROGRESS NOTES
Chief Complaint   Patient presents with    Back Pain     back and R/leg pain     I have reviewed the patient's medical history in detail and updated the computerized patient record. Health Maintenance reviewed. 1. Have you been to the ER, urgent care clinic since your last visit? Hospitalized since your last visit?no    2. Have you seen or consulted any other health care providers outside of the [address] [place] [address] Home since your last visit? Include any pap smears or colon screening. No    Encouraged pt to discuss pt's wishes with spouse/partner/family and bring them in the next appt to follow thru with the Advanced Directive    Fall Risk Assessment, last 12 mths 3/30/2018   Able to walk? Yes   Fall in past 12 months? No       PHQ over the last two weeks 3/30/2018   Little interest or pleasure in doing things Several days   Feeling down, depressed or hopeless Several days   Total Score PHQ 2 2       Abuse Screening Questionnaire 3/30/2018   Do you ever feel afraid of your partner? N   Are you in a relationship with someone who physically or mentally threatens you? N   Is it safe for you to go home?  Y       ADL Assessment 3/30/2018   Feeding yourself No Help Needed   Getting from bed to chair No Help Needed   Getting dressed No Help Needed   Bathing or showering No Help Needed   Walk across the room (includes cane/walker) No Help Needed   Using the telphone No Help Needed   Taking your medications No Help Needed   Preparing meals No Help Needed   Managing money (expenses/bills) No Help Needed   Moderately strenuous housework (laundry) No Help Needed   Shopping for personal items (toiletries/medicines) No Help Needed   Shopping for groceries No Help Needed   Driving Help Needed   Climbing a flight of stairs Help Needed   Getting to places beyond walking distances Help Needed

## 2018-03-30 NOTE — PATIENT INSTRUCTIONS

## 2018-03-30 NOTE — PROGRESS NOTES
HISTORY OF PRESENT ILLNESS  Brooklyn Nugent is a 47 y.o. male. Back Pain    The history is provided by the patient. This is a recurrent problem. Episode onset: 3 months. The problem has been gradually worsening. The problem occurs constantly. Patient reports not work related injury. The pain is associated with no known injury. The pain is present in the lumbar spine. The quality of the pain is described as aching. The pain radiates to the right thigh and right knee. The pain is at a severity of 9/10. Pertinent negatives include no fever, no weight loss, no bowel incontinence, no perianal numbness, no bladder incontinence and no dysuria. Treatments tried: percs helped. The patient's surgical history non-contributory   Has an appointment with orthopedics but it is not until May. Patient Active Problem List   Diagnosis Code   (none) - all problems resolved or deleted         Review of Systems   Constitutional: Negative for fever and weight loss. Gastrointestinal: Negative for bowel incontinence. No incontinence   Genitourinary: Negative for bladder incontinence and dysuria. Musculoskeletal: Positive for back pain. Physical Exam  Visit Vitals    /89 (BP 1 Location: Left arm, BP Patient Position: Sitting)    Pulse 65    Temp 98.3 °F (36.8 °C) (Oral)    Resp 16    Ht 6' 1\" (1.854 m)    Wt 230 lb (104.3 kg)    SpO2 100%    BMI 30.34 kg/m2     WD WN male NAD  Heart RRR without murmers clicks or rubs  Lungs CTA  Abdo soft nontender  Ext no edema  Back was examined, grossly normal, no lesions or rash. Saddle area, sensation present. Patellar reflexes 2+ equal bilaterally. Lower leg strength 5 out of 5 bilateral.  Straight leg raises + right    ASSESSMENT and PLAN  Encounter Diagnoses   Name Primary?     Right-sided low back pain with right-sided sciatica, unspecified chronicity Yes     Orders Placed This Encounter    REFERRAL TO ORTHOPEDICS    predniSONE (DELTASONE) 10 mg tablet    oxyCODONE-acetaminophen (PERCOCET) 5-325 mg per tablet     Discussed the nature of back pain. Discussed how this is in general a 'red flag' diagnosis and the general limited and temporary nature of this problem as well as its re-occurrence. Discussed further work-up and treatment options. Discused narcotics and back pain: yes, LAST narcotic Rx for this problem. He must f/u with ortho  We have gotten him an appointment to see orthopedics in the next 2 weeks. He was looked up in the , no evidence of abuse of narcotics seen. We discussed this pain and the usage of controlled substances for back pain relief. In general these medications are indicated for the acute symptom relief and not for chronic usage, allthough they are frequently used for chronic management  After a certain period of time they should be stopped to avoid dependence and/or addiction. I warned him about the dangers of addiction and other side affects including respiratory depression and death. Discussed how this is a controlled substance and that the prescribing of it is should be monitored very carefully. Drug usage is also monitored by our practise and the [facility], since there has been a lot of abuse and diversion of controlled substances. In general we do not refill this medication over the phone. PLease ask for enough pills to get you to your next appointment and make sure you keep your appointments. Warned not to take other medications like alcohol, other benzodiazapines marijuana or other narcotics when on this medication. Emphasized and he understands that this will be a one-time prescription for narcotics.

## 2018-03-30 NOTE — PROGRESS NOTES
REFERRAL TO Rene Jackman MD  [facility] [address]  [address]  [place], [facility]    Tele [phone]  Fax  [phone]    Date:  April 19, 2018  @1:50PM  Pt has a copy of this referral  Bring picture ID, insurance cards and a list of medications. Arrive 15-30 early.

## 2018-03-30 NOTE — MR AVS SNAPSHOT
[address] [place] [address] 
[phone] Patient: Roma Louis MRN: [id number] :1965 Visit Information Date & Time Provider Department Dept. Phone Encounter #  
 3/30/2018  3:00 PM MD Melia Giordano Dr [id number] Follow-up Instructions Return if symptoms worsen or fail to improve. Upcoming Health Maintenance Date Due FOBT Q 1 YEAR AGE 50-75 2018* DTaP/Tdap/Td series (2 - Td) 2028 *Topic was postponed. The date shown is not the original due date. Allergies as of 3/30/2018  Review Complete On: 3/30/2018 By: Emmie Orlando LPN Severity Noted Reaction Type Reactions Tramadol High 2016    Anaphylaxis Ibuprofen  2014    Nausea Only Keflex [Cephalexin]  2016    Itching Morphine  2014    Hives Pcn [Penicillins]  2016    Angioedema Current Immunizations  Never Reviewed No immunizations on file. Not reviewed this visit You Were Diagnosed With   
  
 Codes Comments Right-sided low back pain with right-sided sciatica, unspecified chronicity    -  Primary ICD-10-CM: M54.41 
ICD-9-CM: 724.3 Vitals BP Pulse Temp Resp Height(growth percentile) Weight(growth percentile) 130/89 (BP 1 Location: Left arm, BP Patient Position: Sitting) 65 98.3 °F (36.8 °C) (Oral) 16 6' 1\" (1.854 m) 230 lb (104.3 kg) SpO2 BMI Smoking Status 100% 30.34 kg/m2 Never Smoker Vitals History BMI and BSA Data Body Mass Index Body Surface Area  
 30.34 kg/m 2 2.32 m 2 Preferred Pharmacy Pharmacy Name Phone [facility], 1 [facility] [phone] Your Updated Medication List  
  
   
This list is accurate as of 3/30/18  4:04 PM.  Always use your most recent med list.  
  
  
  
  
 oxyCODONE-acetaminophen 5-325 mg per tablet Commonly known as:  PERCOCET Take 1 Tab by mouth every eight (8) hours as needed for Pain. Max Daily Amount: 3 Tabs. predniSONE 10 mg tablet Commonly known as:  DELTASONE  
4 tabs for 2 day, 3 tabs for 2 days, 2 tabs for 2 days, 1tab for 2 days Prescriptions Printed Refills  
 oxyCODONE-acetaminophen (PERCOCET) 5-325 mg per tablet 0 Sig: Take 1 Tab by mouth every eight (8) hours as needed for Pain. Max Daily Amount: 3 Tabs. Class: Print Route: Oral  
  
Prescriptions Sent to Pharmacy Refills  
 predniSONE (DELTASONE) 10 mg tablet 0 Si tabs for 2 day, 3 tabs for 2 days, 2 tabs for 2 days, 1tab for 2 days Class: Normal  
 [facility]acy: [facility] [address] [place] [address] #: [phone] We Performed the Following REFERRAL TO ORTHOPEDICS [DCG626 Custom] Follow-up Instructions Return if symptoms worsen or fail to improve. Referral Information Referral ID Referred By Referred To  
  
 [id number] [address] Phone: [phone] Visits Status Start Date End Date 1 New Request 3/30/18 3/30/19 If your referral has a status of pending review or denied, additional information will be sent to support the outcome of this decision. Patient Instructions Back Pain: Care Instructions Your Care Instructions Back pain has many possible causes. It is often related to problems with muscles and ligaments of the back. It may also be related to problems with the nerves, discs, or bones of the back. Moving, lifting, standing, sitting, or sleeping in an awkward way can strain the back. Sometimes you don't notice the injury until later. Arthritis is another common cause of back pain.  
Although it may hurt a lot, back pain usually improves on its own within several weeks. Most people recover in 12 weeks or less. Using good home treatment and being careful not to stress your back can help you feel better sooner. Follow-up care is a key part of your treatment and safety. Be sure to make and go to all appointments, and call your doctor if you are having problems. It's also a good idea to know your test results and keep a list of the medicines you take. How can you care for yourself at home? · Sit or lie in positions that are most comfortable and reduce your pain. Try one of these positions when you lie down: ¨ Lie on your back with your knees bent and supported by large pillows. ¨ Lie on the floor with your legs on the seat of a sofa or chair. Blima Naegeli on your side with your knees and hips bent and a pillow between your legs. ¨ Lie on your stomach if it does not make pain worse. · Do not sit up in bed, and avoid soft couches and twisted positions. Bed rest can help relieve pain at first, but it delays healing. Avoid bed rest after the first day of back pain. · Change positions every 30 minutes. If you must sit for long periods of time, take breaks from sitting. Get up and walk around, or lie in a comfortable position. · Try using a heating pad on a low or medium setting for 15 to 20 minutes every 2 or 3 hours. Try a warm shower in place of one session with the heating pad. · You can also try an ice pack for 10 to 15 minutes every 2 to 3 hours. Put a thin cloth between the ice pack and your skin. · Take pain medicines exactly as directed. ¨ If the doctor gave you a prescription medicine for pain, take it as prescribed. ¨ If you are not taking a prescription pain medicine, ask your doctor if you can take an over-the-counter medicine. · Take short walks several times a day. You can start with 5 to 10 minutes, 3 or 4 times a day, and work up to longer walks. Walk on level surfaces and avoid hills and stairs until your back is better. · Return to work and other activities as soon as you can. Continued rest without activity is usually not good for your back. · To prevent future back pain, do exercises to stretch and strengthen your back and stomach. Learn how to use good posture, safe lifting techniques, and proper body mechanics. When should you call for help? Call your doctor now or seek immediate medical care if: 
? · You have new or worsening numbness in your legs. ? · You have new or worsening weakness in your legs. (This could make it hard to stand up.) ? · You lose control of your bladder or bowels. ? Watch closely for changes in your health, and be sure to contact your doctor if: 
? · Your pain gets worse. ? · You are not getting better after 2 weeks. Where can you learn more? Go to http://jesse-trena.info/. Enter K425 in the search box to learn more about \"Back Pain: Care Instructions. \" Current as of: March 21, 2017 Content Version: 11.4 © 2538-5338 [employer]. Care instructions adapted under license by [employer] (which disclaims liability or warranty for this information). If you have questions about a medical condition or this instruction, always ask your healthcare professional. [place] [address] any warranty or liability for your use of this information. Introducing [facility]! [employer] introduces Graphicly patient portal. Now you can access parts of your medical record, email your doctor's office, and request medication refills online. 1. In your internet browser, go to [email or web address] 2. Click on the First Time User? Click Here link in the Sign In box. You will see the New Member Sign Up page. 3. Enter your Graphicly Access Code exactly as it appears below. You will not need to use this code after youve completed the sign-up process.  If you do not sign up before the expiration date, you must request a new code. 
 
· Edevate Access Code: Joleen Aleman Expires: 6/28/2018  2:59 PM 
 
4. Enter the last four digits of your Social Security Number (xxxx) and Date of Birth (mm/dd/yyyy) as indicated and click Submit. You will be taken to the next sign-up page. 5. Create a Edevate ID. This will be your Edevate login ID and cannot be changed, so think of one that is secure and easy to remember. 6. Create a Edevate password. You can change your password at any time. 7. Enter your Password Reset Question and Answer. This can be used at a later time if you forget your password. 8. Enter your e-mail address. You will receive e-mail notification when new information is available in [address] 9. Click Sign Up. You can now view and download portions of your medical record. 10. Click the Download Summary menu link to download a portable copy of your medical information. If you have questions, please visit the Frequently Asked Questions section of the Edevate website. Remember, Edevate is NOT to be used for urgent needs. For medical emergencies, dial 911. Now available from your iPhone and Android! Please provide this summary of care documentation to your next provider. Your primary care clinician is listed as Manoj Hairston. If you have any questions after today's visit, please call [phone].

## 2018-05-11 ENCOUNTER — OFFICE VISIT (OUTPATIENT)
Dept: INTERNAL MEDICINE CLINIC | Age: 53
End: 2018-05-11

## 2018-05-11 VITALS
DIASTOLIC BLOOD PRESSURE: 90 MMHG | OXYGEN SATURATION: 100 % | BODY MASS INDEX: 30.48 KG/M2 | WEIGHT: 230 LBS | SYSTOLIC BLOOD PRESSURE: 127 MMHG | TEMPERATURE: 98.3 F | RESPIRATION RATE: 16 BRPM | HEART RATE: 59 BPM | HEIGHT: 73 IN

## 2018-05-11 DIAGNOSIS — K59.00 CONSTIPATION, UNSPECIFIED CONSTIPATION TYPE: ICD-10-CM

## 2018-05-11 DIAGNOSIS — R10.10 PAIN OF UPPER ABDOMEN: ICD-10-CM

## 2018-05-11 DIAGNOSIS — K43.9 VENTRAL HERNIA WITHOUT OBSTRUCTION OR GANGRENE: Primary | ICD-10-CM

## 2018-05-11 RX ORDER — HYDROCODONE BITARTRATE AND ACETAMINOPHEN 5; 325 MG/1; MG/1
1 TABLET ORAL
Qty: 28 TAB | Refills: 0 | Status: SHIPPED | OUTPATIENT
Start: 2018-05-11 | End: 2018-06-29 | Stop reason: ALTCHOICE

## 2018-05-11 NOTE — PROGRESS NOTES
Chief Complaint   Patient presents with    Abdominal Pain     follow up     I have reviewed the patient's medical history in detail and updated the computerized patient record. Health Maintenance reviewed. 1. Have you been to the ER, urgent care clinic since your last visit? Hospitalized since your last visit? yes    2. Have you seen or consulted any other health care providers outside of the [address] [place] [address] since your last visit? Include any pap smears or colon screening. Yes  [facility]     Encouraged pt to discuss pt's wishes with spouse/partner/family and bring them in the next appt to follow thru with the Advanced Directive    Fall Risk Assessment, last 12 mths 3/30/2018   Able to walk? Yes   Fall in past 12 months? No       PHQ over the last two weeks 5/11/2018   Little interest or pleasure in doing things Several days   Feeling down, depressed or hopeless Several days   Total Score PHQ 2 2       Abuse Screening Questionnaire 3/30/2018   Do you ever feel afraid of your partner? N   Are you in a relationship with someone who physically or mentally threatens you? N   Is it safe for you to go home?  Y       ADL Assessment 3/30/2018   Feeding yourself No Help Needed   Getting from bed to chair No Help Needed   Getting dressed No Help Needed   Bathing or showering No Help Needed   Walk across the room (includes cane/walker) No Help Needed   Using the telphone No Help Needed   Taking your medications No Help Needed   Preparing meals No Help Needed   Managing money (expenses/bills) No Help Needed   Moderately strenuous housework (laundry) No Help Needed   Shopping for personal items (toiletries/medicines) No Help Needed   Shopping for groceries No Help Needed   Driving Help Needed   Climbing a flight of stairs Help Needed   Getting to places beyond walking distances Help Needed

## 2018-05-11 NOTE — MR AVS SNAPSHOT
[address] 
 
 
 [address] [place] [address]. [address] [facility] 
[phone] Patient: Virgilio Amin MRN: [id number] :1965 Visit Information Date & Time Provider Department Dept. Phone Encounter #  
 2018  3[facility]15 [phone] Follow-up Instructions Return if symptoms worsen or fail to improve. Your Appointments 5/15/2018  2:45 PM  
ROUTINE CARE with Lucien Nieto MD  
[facility] ([facility]) Appt Note: EST pt. follow up  [facility] ER  2018, hernia  
 [address] [facility]. [address] [place] [address]  
[phone]  
  
   
 [address] [place] [address] [address] Upcoming Health Maintenance Date Due FOBT Q 1 YEAR AGE 50-75 2018* Influenza Age 5 to Adult 2018 DTaP/Tdap/Td series (2 - Td) 2028 *Topic was postponed. The date shown is not the original due date. Allergies as of 2018  Review Complete On: 2018 By: Lucien Nieto MD  
  
 Severity Noted Reaction Type Reactions Tramadol High 2016    Anaphylaxis Ibuprofen  2014    Nausea Only Keflex [Cephalexin]  2016    Itching Morphine  2014    Hives Pcn [Penicillins]  2016    Angioedema Current Immunizations  Never Reviewed No immunizations on file. Not reviewed this visit You Were Diagnosed With   
  
 Codes Comments Ventral hernia without obstruction or gangrene    -  Primary ICD-10-CM: K43.9 ICD-9-CM: 553.20 Vitals BP Pulse Temp Resp Height(growth percentile) Weight(growth percentile) 127/90 (BP 1 Location: Left arm, BP Patient Position: Sitting) (!) 59 98.3 °F (36.8 °C) (Oral) 16 6' 1\" (1.854 m) 230 lb (104.3 kg) SpO2 BMI Smoking Status 100% 30.34 kg/m2 Never Smoker BMI and BSA Data Body Mass Index Body Surface Area  
 30.34 kg/m 2 2.32 m 2 Preferred Pharmacy Pharmacy Name Phone [facility], 1 [facility] [phone] Your Updated Medication List  
  
   
This list is accurate as of 5/11/18  4:43 PM.  Always use your most recent med list.  
  
  
  
  
 HYDROcodone-acetaminophen 5-325 mg per tablet Commonly known as:  Orren Maya Take 1 Tab by mouth every six (6) hours as needed for Pain. Max Daily Amount: 4 Tabs. Prescriptions Printed Refills HYDROcodone-acetaminophen (NORCO) 5-325 mg per tablet 0 Sig: Take 1 Tab by mouth every six (6) hours as needed for Pain. Max Daily Amount: 4 Tabs. Class: Print Route: Oral  
  
We Performed the Following REFERRAL TO GENERAL SURGERY [REF27 Custom] Comments:  
 Hernia pain Follow-up Instructions Return if symptoms worsen or fail to improve. Referral Information Referral ID Referred By Referred To  
  
 [id number] Meggan Santiago MD   
   [address] [place] [address], [address] [facility] Phone: [phone] Fax: [phone] Visits Status Start Date End Date 1 New Request 5/11/18 5/11/19 If your referral has a status of pending review or denied, additional information will be sent to support the outcome of this decision. Introducing [facility]! Ynes Guzman introduces 3D Data patient portal. Now you can access parts of your medical record, email your doctor's office, and request medication refills online. 1. In your internet browser, go to [email or web address] 2. Click on the First Time User? Click Here link in the Sign In box. You will see the New Member Sign Up page. 3. Enter your 3D Data Access Code exactly as it appears below. You will not need to use this code after youve completed the sign-up process.  If you do not sign up before the expiration date, you must request a new code. · Medialets Access Code: Qiana Goldstein Expires: 6/28/2018  2:59 PM 
 
4. Enter the last four digits of your Social Security Number (xxxx) and Date of Birth (mm/dd/yyyy) as indicated and click Submit. You will be taken to the next sign-up page. 5. Create a Medialets ID. This will be your Medialets login ID and cannot be changed, so think of one that is secure and easy to remember. 6. Create a Medialets password. You can change your password at any time. 7. Enter your Password Reset Question and Answer. This can be used at a later time if you forget your password. 8. Enter your e-mail address. You will receive e-mail notification when new information is available in [address] 9. Click Sign Up. You can now view and download portions of your medical record. 10. Click the Download Summary menu link to download a portable copy of your medical information. If you have questions, please visit the Frequently Asked Questions section of the Medialets website. Remember, Medialets is NOT to be used for urgent needs. For medical emergencies, dial 911. Now available from your iPhone and Android! Please provide this summary of care documentation to your next provider. Your primary care clinician is listed as Bubba Tapia. If you have any questions after today's visit, please call [phone].

## 2018-05-11 NOTE — LETTER
NOTIFICATION OF RETURN TO WORK Mr. Savannah Wyatt [address] [facility] [address] [place] To Whom It May Concern: 
 
Savannah Wyatt was under the care of [address] on th following dates: 
 
May 11, 2018, March 30, 2018, February 22, 2018, December 19, 2017, December 15, 2017, and May 5, 2016. If there are questions or concerns please have the patient contact our office. Sincerely, Aldair Graf MD

## 2018-05-11 NOTE — PROGRESS NOTES
HISTORY OF PRESENT ILLNESS  Roma Louis is a 47 y.o. male. Abdominal Pain   The history is provided by the patient. This is a recurrent problem. Episode onset: 4 months. The problem occurs hourly. The problem has been gradually worsening. Associated symptoms include abdominal pain. Treatments tried: went to ER 4 days ago, said hernai. Improvement on treatment: given a shot. Helped for a short time but he is uncomfortable. Relates not had a bowel movement in several days. No blood. Recommended surgical repair but feels he is not ready to have this done. Patient Active Problem List   Diagnosis Code    Ventral hernia without obstruction or gangrene K43.9     History reviewed. No pertinent surgical history. Labs from today were reviewed  no, labs done previously were reviewed  yes, Labs done in ER were reviewed  yes, Additional labs are ordered  no,      Review of Systems   Constitutional: Negative for fever. Gastrointestinal: Positive for abdominal pain and constipation. Negative for blood in stool, diarrhea, nausea and vomiting. Genitourinary: Negative for dysuria. Physical Exam  Visit Vitals    /90 (BP 1 Location: Left arm, BP Patient Position: Sitting)    Pulse (!) 59    Temp 98.3 °F (36.8 °C) (Oral)    Resp 16    Ht 6' 1\" (1.854 m)    Wt 230 lb (104.3 kg)    SpO2 100%    BMI 30.34 kg/m2     WD WN male NAD  Heart RRR without murmers clicks or rubs  Lungs CTA  Abdo soft tenderness to palpation where ventral hernia seems to be, groin area seems to be unremarkable. Ext no edema    ASSESSMENT and PLAN      ICD-10-CM ICD-9-CM    1. Ventral hernia without obstruction or gangrene K43.9 553.20 REFERRAL TO GENERAL SURGERY      HYDROcodone-acetaminophen (NORCO) 5-325 mg per tablet   2. Pain of upper abdomen R10.10 789.09    3.  Constipation, unspecified constipation type K59.00 564.00        Orders Placed This Encounter    REFERRAL TO GENERAL SURGERY    HYDROcodone-acetaminophen (NORCO) 5-325 mg per tablet     Given his discomfort and pain narcotic would be only a temporary solution could possibly aggravate his constipation. Okay short-term narcotic usage as above. Needs to see surgeon. Abdominal precautions given. He agreed to see our surgeon to discuss the situation. We discussed this pain and the usage of controlled substances for abdominal pain relief. In general these medications are indicated for the acute symptom relief and not for chronic usage, allthough they are frequently used for chronic management  After a certain period of time they should be stopped to avoid dependence and/or addiction. I warned him about the dangers of addiction and other side affects including respiratory depression and death. Discussed how this is a controlled substance and that the prescribing of it is should be monitored very carefully. Drug usage is also monitored by our practise and the [facility], since there has been a lot of abuse and diversion of controlled substances. In general we do not refill this medication over the phone. PLease ask for enough pills to get you to your next appointment and make sure you keep your appointments. Warned not to take other medications like alcohol, other benzodiazapines marijuana or other narcotics when on this medication. Recommended OTCs like MiraLAX for his constipation. Follow-up Disposition:  Return if symptoms worsen or fail to improve.

## 2018-05-13 PROBLEM — K43.9 VENTRAL HERNIA WITHOUT OBSTRUCTION OR GANGRENE: Status: ACTIVE | Noted: 2018-05-13

## 2018-06-29 ENCOUNTER — OFFICE VISIT (OUTPATIENT)
Dept: INTERNAL MEDICINE CLINIC | Age: 53
End: 2018-06-29

## 2018-06-29 VITALS
TEMPERATURE: 96.6 F | WEIGHT: 232 LBS | HEART RATE: 59 BPM | OXYGEN SATURATION: 100 % | BODY MASS INDEX: 30.75 KG/M2 | RESPIRATION RATE: 16 BRPM | HEIGHT: 73 IN | DIASTOLIC BLOOD PRESSURE: 87 MMHG | SYSTOLIC BLOOD PRESSURE: 139 MMHG

## 2018-06-29 DIAGNOSIS — M79.604 LEG PAIN, BILATERAL: Primary | ICD-10-CM

## 2018-06-29 DIAGNOSIS — M79.605 LEG PAIN, BILATERAL: Primary | ICD-10-CM

## 2018-06-29 NOTE — PROGRESS NOTES
Chief Complaint   Patient presents with    Leg Pain     L/R leg aching pain started yesterday     I have reviewed the patient's medical history in detail and updated the computerized patient record. Health Maintenance reviewed. 1. Have you been to the ER, urgent care clinic since your last visit? Hospitalized since your last visit?no    2. Have you seen or consulted any other health care providers outside of the [address] [place] [address] since your last visit? Include any pap smears or colon screening. No    Encouraged pt to discuss pt's wishes with spouse/partner/family and bring them in the next appt to follow thru with the Advanced Directive    Fall Risk Assessment, last 12 mths 3/30/2018   Able to walk? Yes   Fall in past 12 months? No       PHQ over the last two weeks 6/29/2018   Little interest or pleasure in doing things Several days   Feeling down, depressed or hopeless Several days   Total Score PHQ 2 2       Abuse Screening Questionnaire 3/30/2018   Do you ever feel afraid of your partner? N   Are you in a relationship with someone who physically or mentally threatens you? N   Is it safe for you to go home?  Y       ADL Assessment 3/30/2018   Feeding yourself No Help Needed   Getting from bed to chair No Help Needed   Getting dressed No Help Needed   Bathing or showering No Help Needed   Walk across the room (includes cane/walker) No Help Needed   Using the telphone No Help Needed   Taking your medications No Help Needed   Preparing meals No Help Needed   Managing money (expenses/bills) No Help Needed   Moderately strenuous housework (laundry) No Help Needed   Shopping for personal items (toiletries/medicines) No Help Needed   Shopping for groceries No Help Needed   Driving Help Needed   Climbing a flight of stairs Help Needed   Getting to places beyond walking distances Help Needed

## 2018-06-29 NOTE — PATIENT INSTRUCTIONS
Acetaminophen (Tylenol) can help with the pain. You can take 2 every 8 hours or up to 6 extra strength (500mg)  Tylenol per day. Aleve or Advil can also be tried.

## 2018-06-29 NOTE — PROGRESS NOTES
HISTORY OF PRESENT ILLNESS  Mik Bejarano is a 47 y.o. male. Leg Pain    The history is provided by the patient. This is a new problem. The current episode started yesterday. The problem occurs constantly. The problem has been gradually worsening. The pain is present in the left upper leg, left lower leg, right upper leg and right lower leg (both legs equally). The pain is moderate. Pertinent negatives include no numbness, no tingling and no back pain. The symptoms are aggravated by standing. He has tried nothing for the symptoms. There has been no history of extremity trauma. Asked his boss if he could be evaluated and they let him go that he could see me. No specific trauma noted. No meds   Allergies   Allergen Reactions    Tramadol Anaphylaxis    Ibuprofen Nausea Only    Keflex [Cephalexin] Itching    Morphine Hives    Pcn [Penicillins] Angioedema     Social History     Social History    Marital status:      Spouse name: N/A    Number of children: 3    Years of education: N/A     Occupational History    break shoes      Social History Main Topics    Smoking status: Never Smoker    Smokeless tobacco: Never Used    Alcohol use No    Drug use: No    Sexual activity: Yes     Partners: Female     Other Topics Concern    Not on file     Social History Narrative    Living with wife and 3 kids       Review of Systems   Constitutional: Negative for fever. Musculoskeletal: Negative for back pain and falls. Skin: Negative for rash. Neurological: Negative for tingling and numbness.        Physical Exam  Visit Vitals    /87 (BP 1 Location: Left arm, BP Patient Position: Sitting)    Pulse (!) 59    Temp 96.6 °F (35.9 °C) (Oral)    Resp 16    Ht 6' 1\" (1.854 m)    Wt 232 lb (105.2 kg)    SpO2 100%    BMI 30.61 kg/m2     WD WN male NAD  Heart RRR without murmers clicks or rubs  Lungs CTA  Abdo soft nontender  Ext no edema  Reflexes 2+ = Bi  Nl grossly  ASSESSMENT and PLAN  Encounter Diagnoses   Name Primary?  Leg pain, bilateral Yes     No orders of the defined types were placed in this encounter. No cause for symptoms noted reassurance return if symptoms continue no given for work for rest.  Follow-up Disposition:  Return if symptoms worsen or fail to improve.

## 2018-06-29 NOTE — LETTER
NOTIFICATION OF RETURN TO WORK 
 
6/29/2018 Mr. Alberta Shields [address] [facility] [address] Damien Wills To Whom It May Concern: 
 
Alberta Shields was under the care of [address]. He will be able to return to work on July 2, 2018. If there are questions or concerns please have the patient contact our office. Sincerely, Lyubov Basurto MD

## 2018-06-29 NOTE — MR AVS SNAPSHOT
[address] [address] [facility] 
[phone] Patient: Malorie Naylor MRN: [id number] :1965 Visit Information Date & Time Provider Department Dept. Phone Encounter #  
 2018  9:00 AM Kellee Aguilar MD [place] [address] [phone] [id number] Follow-up Instructions Return if symptoms worsen or fail to improve. Upcoming Health Maintenance Date Due FOBT Q 1 YEAR AGE 50-75 4/10/2016 Influenza Age 5 to Adult 2018 DTaP/Tdap/Td series (2 - Td) 2028 Allergies as of 2018  Review Complete On: 2018 By: Kellee Aguilar MD  
  
 Severity Noted Reaction Type Reactions Tramadol High 2016    Anaphylaxis Ibuprofen  2014    Nausea Only Keflex [Cephalexin]  2016    Itching Morphine  2014    Hives Pcn [Penicillins]  2016    Angioedema Current Immunizations  Never Reviewed No immunizations on file. Not reviewed this visit You Were Diagnosed With   
  
 Codes Comments Leg pain, bilateral    -  Primary ICD-10-CM: M79.604, M79.605 ICD-9-CM: 729.5 Vitals BP Pulse Temp Resp Height(growth percentile) Weight(growth percentile) 139/87 (BP 1 Location: Left arm, BP Patient Position: Sitting) (!) 59 96.6 °F (35.9 °C) (Oral) 16 6' 1\" (1.854 m) 232 lb (105.2 kg) SpO2 BMI Smoking Status 100% 30.61 kg/m2 Never Smoker Vitals History BMI and BSA Data Body Mass Index Body Surface Area  
 30.61 kg/m 2 2.33 m 2 Preferred Pharmacy Pharmacy Name Phone [facility] [address], [address] [facility] [phone] Your Updated Medication List  
  
Notice  As of 2018  9:14 AM  
 You have not been prescribed any medications. Follow-up Instructions Return if symptoms worsen or fail to improve. Patient Instructions Acetaminophen (Tylenol) can help with the pain. You can take 2 every 8 hours or up to 6 extra strength (500mg)  Tylenol per day. Aleve or Advil can also be tried. Introducing [facility] SERVICES! Narendra Armas introduces LookMedBook patient portal. Now you can access parts of your medical record, email your doctor's office, and request medication refills online. 1. In your internet browser, go to [email or web address] 2. Click on the First Time User? Click Here link in the Sign In box. You will see the New Member Sign Up page. 3. Enter your LookMedBook Access Code exactly as it appears below. You will not need to use this code after youve completed the sign-up process. If you do not sign up before the expiration date, you must request a new code. · LookMedBook Access Code: [email or web address] Expires: 9/27/2018  8:52 AM 
 
4. Enter the last four digits of your Social Security Number (xxxx) and Date of Birth (mm/dd/yyyy) as indicated and click Submit. You will be taken to the next sign-up page. 5. Create a LookMedBook ID. This will be your LookMedBook login ID and cannot be changed, so think of one that is secure and easy to remember. 6. Create a LookMedBook password. You can change your password at any time. 7. Enter your Password Reset Question and Answer. This can be used at a later time if you forget your password. 8. Enter your e-mail address. You will receive e-mail notification when new information is available in [address] 9. Click Sign Up. You can now view and download portions of your medical record. 10. Click the Download Summary menu link to download a portable copy of your medical information. If you have questions, please visit the Frequently Asked Questions section of the LookMedBook website. Remember, LookMedBook is NOT to be used for urgent needs. For medical emergencies, dial 911. Now available from your iPhone and Android! Please provide this summary of care documentation to your next provider. Your primary care clinician is listed as Jose Gil. If you have any questions after today's visit, please call [phone].

## 2018-07-13 ENCOUNTER — OFFICE VISIT (OUTPATIENT)
Dept: INTERNAL MEDICINE CLINIC | Age: 53
End: 2018-07-13

## 2018-07-13 VITALS
DIASTOLIC BLOOD PRESSURE: 93 MMHG | BODY MASS INDEX: 31.25 KG/M2 | SYSTOLIC BLOOD PRESSURE: 142 MMHG | TEMPERATURE: 97.4 F | WEIGHT: 235.8 LBS | OXYGEN SATURATION: 99 % | RESPIRATION RATE: 24 BRPM | HEART RATE: 62 BPM | HEIGHT: 73 IN

## 2018-07-13 DIAGNOSIS — M79.605 LEFT LEG PAIN: ICD-10-CM

## 2018-07-13 DIAGNOSIS — M79.89 LEFT LEG SWELLING: Primary | ICD-10-CM

## 2018-07-13 LAB
BILIRUB UR QL STRIP: NEGATIVE
GLUCOSE UR-MCNC: NEGATIVE MG/DL
KETONES P FAST UR STRIP-MCNC: NEGATIVE MG/DL
PH UR STRIP: 5.5 [PH] (ref 4.6–8)
PROT UR QL STRIP: NEGATIVE
SP GR UR STRIP: 1.02 (ref 1–1.03)
UA UROBILINOGEN AMB POC: NORMAL (ref 0.2–1)
URINALYSIS CLARITY POC: CLEAR
URINALYSIS COLOR POC: YELLOW
URINE BLOOD POC: NEGATIVE
URINE LEUKOCYTES POC: NORMAL
URINE NITRITES POC: NEGATIVE

## 2018-07-13 RX ORDER — OXYCODONE AND ACETAMINOPHEN 5; 325 MG/1; MG/1
TABLET ORAL
Refills: 0 | COMMUNITY
Start: 2018-05-15 | End: 2018-07-13 | Stop reason: ALTCHOICE

## 2018-07-13 RX ORDER — OXYCODONE AND ACETAMINOPHEN 5; 325 MG/1; MG/1
1 TABLET ORAL
Qty: 20 TAB | Refills: 0 | Status: SHIPPED | OUTPATIENT
Start: 2018-07-13 | End: 2018-09-03

## 2018-07-13 NOTE — MR AVS SNAPSHOT
Renetta Cohn 
 
 
 [address] [place] [address]. [facility] [address] [place] [address] 
[phone] Patient: Natacha Beach MRN: [id number] :1965 Visit Information Date & Time Provider Department Dept. Phone Encounter #  
 2018  9:00 AM Sonia Odom  [facility] [phone] [id number] Follow-up Instructions Return in about 3 days (around 2018) for routine follow up. Upcoming Health Maintenance Date Due FOBT Q 1 YEAR AGE 50-75 4/10/2016 Influenza Age 5 to Adult 2018 DTaP/Tdap/Td series (2 - Td) 2028 Allergies as of 2018  Review Complete On: 2018 By: Sonia Odom MD  
  
 Severity Noted Reaction Type Reactions Tramadol High 2016    Anaphylaxis Ibuprofen  2014    Nausea Only Keflex [Cephalexin]  2016    Itching Morphine  2014    Hives Pcn [Penicillins]  2016    Angioedema Current Immunizations  Never Reviewed No immunizations on file. Not reviewed this visit You Were Diagnosed With   
  
 Codes Comments Left leg swelling    -  Primary ICD-10-CM: M79.89 ICD-9-CM: 729.81 Left leg pain     ICD-10-CM: M79.605 ICD-9-CM: 729.5 Vitals BP Pulse Temp Resp Height(growth percentile) Weight(growth percentile) (!) 142/93 (BP 1 Location: Right arm, BP Patient Position: Sitting) 62 97.4 °F (36.3 °C) (Oral) 24 6' 1\" (1.854 m) 235 lb 12.8 oz (107 kg) SpO2 BMI Smoking Status 99% 31.11 kg/m2 Never Smoker Vitals History BMI and BSA Data Body Mass Index Body Surface Area  
 31.11 kg/m 2 2.35 m 2 Preferred Pharmacy Pharmacy Name Phone [facility] [address] [facility], [address] [facility] [phone] Your Updated Medication List  
  
   
This list is accurate as of 18  9:28 AM.  Always use your most recent med list.  
  
  
  
  
 oxyCODONE-acetaminophen 5-325 mg per tablet Commonly known as:  PERCOCET Take 1 Tab by mouth every six (6) hours as needed for Pain. Max Daily Amount: 4 Tabs. Prescriptions Printed Refills  
 oxyCODONE-acetaminophen (PERCOCET) 5-325 mg per tablet 0 Sig: Take 1 Tab by mouth every six (6) hours as needed for Pain. Max Daily Amount: 4 Tabs. Class: Print Route: Oral  
  
We Performed the Following AMB POC URINALYSIS DIP STICK AUTO W/O MICRO [26535 CPT(R)] Follow-up Instructions Return in about 3 days (around 7/16/2018) for routine follow up. To-Do List   
 07/13/2018 Imaging:  DUPLEX LOWER EXT VENOUS BILAT   
  
 07/13/2018 Imaging:  XR HIP LT W OR WO PELV 2-3 VWS   
  
 07/13/2018 Imaging:  XR KNEE LT MAX 2 VWS   
  
 07/13/2018 Imaging:  XR SPINE LUMB 2 OR 3 V   
  
 07/13/2018 Imaging:  XR TIB/FIB LT Introducing [facility] SERVICES! Minerva Collins introduces Bit[employer] patient portal. Now you can access parts of your medical record, email your doctor's office, and request medication refills online. 1. In your internet browser, go to [email or web address] 2. Click on the First Time User? Click Here link in the Sign In box. You will see the New Member Sign Up page. 3. Enter your Bitcast Access Code exactly as it appears below. You will not need to use this code after youve completed the sign-up process. If you do not sign up before the expiration date, you must request a new code. · Bitcast Access Code: [email or web address] Expires: 9/27/2018  8:52 AM 
 
4. Enter the last four digits of your Social Security Number (xxxx) and Date of Birth (mm/dd/yyyy) as indicated and click Submit. You will be taken to the next sign-up page. 5. Create a Bitcast ID. This will be your Bitcast login ID and cannot be changed, so think of one that is secure and easy to remember. 6. Create a [employer] password. You can change your password at any time. 7. Enter your Password Reset Question and Answer. This can be used at a later time if you forget your password. 8. Enter your e-mail address. You will receive e-mail notification when new information is available in [address] 9. Click Sign Up. You can now view and download portions of your medical record. 10. Click the Download Summary menu link to download a portable copy of your medical information. If you have questions, please visit the Frequently Asked Questions section of the [employer] website. Remember, [employer] is NOT to be used for urgent needs. For medical emergencies, dial 911. Now available from your iPhone and Android! Please provide this summary of care documentation to your next provider. Your primary care clinician is listed as Miguel Griffith. If you have any questions after today's visit, please call [phone].

## 2018-07-13 NOTE — PROGRESS NOTES
HISTORY OF PRESENT ILLNESS  Mik Bejarano is a 47 y.o. male. Leg Pain    The history is provided by the patient. This is a new problem. Episode onset: 2-3weeks ago, no pain a moth ago. The problem occurs constantly. The problem has been gradually worsening. The pain is present in the left knee, left lower leg and left upper leg. The quality of the pain is described as aching. The pain is severe. Associated symptoms include numbness, tingling and back pain. Associated symptoms comments: Notes swelling. Treatments tried: has appt with PM next mo. There has been no history of extremity trauma. Diagnosis so far is unclear. No meds    Allergies   Allergen Reactions    Tramadol Anaphylaxis    Ibuprofen Nausea Only    Keflex [Cephalexin] Itching    Morphine Hives    Pcn [Penicillins] Angioedema     Patient Active Problem List   Diagnosis Code    Ventral hernia without obstruction or gangrene K43.9     Social History     Social History    Marital status:      Spouse name: N/A    Number of children: 3    Years of education: N/A     Occupational History    break shoes      Social History Main Topics    Smoking status: Never Smoker    Smokeless tobacco: Never Used    Alcohol use No    Drug use: No    Sexual activity: Yes     Partners: Female     Other Topics Concern    Not on file     Social History Narrative    Living with wife and 3 kids       Review of Systems   Respiratory: Negative for shortness of breath. Cardiovascular: Negative for chest pain. Genitourinary: Negative for dysuria and hematuria. Musculoskeletal: Positive for back pain and joint pain. Negative for falls. Neurological: Positive for tingling and numbness.    No foot pain  Left > r    Social History     Social History    Marital status:      Spouse name: N/A    Number of children: 3    Years of education: N/A     Occupational History    break shoes      Social History Main Topics    Smoking status: Never Smoker    Smokeless tobacco: Never Used    Alcohol use No    Drug use: No    Sexual activity: Yes     Partners: Female     Other Topics Concern    Not on file     Social History Narrative    Living with wife and 3 kids   Not been able to work because of the pain. Physical Exam  Visit Vitals    BP (!) 142/93 (BP 1 Location: Right arm, BP Patient Position: Sitting)    Pulse 62    Temp 97.4 °F (36.3 °C) (Oral)    Resp 24    Ht 6' 1\" (1.854 m)    Wt 235 lb 12.8 oz (107 kg)    SpO2 99%    BMI 31.11 kg/m2     WD WN male NAD  Heart RRR without murmers clicks or rubs  Lungs CTA  Abdo soft nontender  Ext sl left > rigth  Edema, left knee mildly swollen  10 cm 40 cm on the left 39 cm right  ASSESSMENT and PLAN  Encounter Diagnoses   Name Primary?  Left leg swelling Yes    Left leg pain      Orders Placed This Encounter    DUPLEX LOWER EXT VENOUS BILAT    XR SPINE LUMB 2 OR 3 V    XR TIB/FIB LT    XR KNEE LT MAX 2 VWS    XR HIP LT W OR WO PELV 2-3 VWS    AMB POC URINALYSIS DIP STICK AUTO W/O MICRO    DISCONTD: oxyCODONE-acetaminophen (PERCOCET) 5-325 mg per tablet    oxyCODONE-acetaminophen (PERCOCET) 5-325 mg per tablet     Current Outpatient Prescriptions   Medication Sig Dispense Refill    oxyCODONE-acetaminophen (PERCOCET) 5-325 mg per tablet Take 1 Tab by mouth every six (6) hours as needed for Pain. Max Daily Amount: 4 Tabs. 20 Tab 0     Start workup of his pain as above. Follow-up Disposition:  Return in about 3 days (around 7/16/2018) for routine follow up.

## 2018-07-13 NOTE — PROGRESS NOTES
Chief Complaint   Patient presents with    Leg Pain     BILATERAL; DENIES INJURY; PERSISTENT X 2 WEEKS     1. Have you been to the ER, urgent care clinic since your last visit? Hospitalized since your last visit? No    2. Have you seen or consulted any other health care providers outside of the [address] [place] [address] since your last visit? Include any pap smears or colon screening. No    Patient will be seeing Dr. Paulino Sims for pain management. Do you have an [address] in place in the event that you have a healthcare crisis that could impact your decision making as it pertains to your health? NO    Would you like information about Advance Care Planning? NO    Information given.  NO    Health Maintenance Due   Topic Date Due    FOBT Q 1 YEAR AGE 50-75  04/10/2016

## 2018-07-16 ENCOUNTER — DOCUMENTATION ONLY (OUTPATIENT)
Dept: INTERNAL MEDICINE CLINIC | Age: 53
End: 2018-07-16

## 2018-07-16 ENCOUNTER — TELEPHONE (OUTPATIENT)
Dept: INTERNAL MEDICINE CLINIC | Age: 53
End: 2018-07-16

## 2018-07-18 NOTE — TELEPHONE ENCOUNTER
Called and notified patient that results of Xray and ultrasound were normal - patient states he is feeling much better today - no leg pains now  Yessenia Franklin LPN  7/13/7502  5:35 AM

## 2018-09-03 ENCOUNTER — APPOINTMENT (OUTPATIENT)
Dept: GENERAL RADIOLOGY | Age: 53
End: 2018-09-03
Attending: EMERGENCY MEDICINE
Payer: COMMERCIAL

## 2018-09-03 ENCOUNTER — HOSPITAL ENCOUNTER (EMERGENCY)
Age: 53
Discharge: HOME | End: 2018-09-03
Attending: EMERGENCY MEDICINE
Payer: COMMERCIAL

## 2018-09-03 VITALS
BODY MASS INDEX: 32.02 KG/M2 | RESPIRATION RATE: 16 BRPM | HEIGHT: 73 IN | TEMPERATURE: 97.3 F | DIASTOLIC BLOOD PRESSURE: 83 MMHG | WEIGHT: 241.62 LBS | SYSTOLIC BLOOD PRESSURE: 130 MMHG | OXYGEN SATURATION: 99 % | HEART RATE: 72 BPM

## 2018-09-03 DIAGNOSIS — J20.9 ACUTE BRONCHITIS, UNSPECIFIED ORGANISM: Primary | ICD-10-CM

## 2018-09-03 PROCEDURE — 74011000250 HC RX REV CODE- 250: Performed by: EMERGENCY MEDICINE

## 2018-09-03 PROCEDURE — 77030029684 HC NEB SM VOL KT MONA -A

## 2018-09-03 PROCEDURE — 99283 EMERGENCY DEPT VISIT LOW MDM: CPT

## 2018-09-03 PROCEDURE — 93005 ELECTROCARDIOGRAM TRACING: CPT

## 2018-09-03 PROCEDURE — 71046 X-RAY EXAM CHEST 2 VIEWS: CPT

## 2018-09-03 PROCEDURE — 74011636637 HC RX REV CODE- 636/637: Performed by: EMERGENCY MEDICINE

## 2018-09-03 PROCEDURE — 94640 AIRWAY INHALATION TREATMENT: CPT

## 2018-09-03 RX ORDER — IPRATROPIUM BROMIDE AND ALBUTEROL SULFATE 2.5; .5 MG/3ML; MG/3ML
3 SOLUTION RESPIRATORY (INHALATION) ONCE
Status: COMPLETED | OUTPATIENT
Start: 2018-09-03 | End: 2018-09-03

## 2018-09-03 RX ORDER — ALBUTEROL SULFATE 90 UG/1
2 AEROSOL, METERED RESPIRATORY (INHALATION)
Qty: 1 INHALER | Refills: 0 | Status: SHIPPED | OUTPATIENT
Start: 2018-09-03 | End: 2019-04-16 | Stop reason: CLARIF

## 2018-09-03 RX ORDER — PREDNISONE 20 MG/1
20 TABLET ORAL 2 TIMES DAILY
Qty: 10 TAB | Refills: 0 | Status: SHIPPED | OUTPATIENT
Start: 2018-09-03 | End: 2018-09-08

## 2018-09-03 RX ORDER — BENZONATATE 200 MG/1
200 CAPSULE ORAL
Qty: 20 CAP | Refills: 0 | Status: SHIPPED | OUTPATIENT
Start: 2018-09-03 | End: 2018-09-10

## 2018-09-03 RX ORDER — PREDNISONE 20 MG/1
60 TABLET ORAL
Status: COMPLETED | OUTPATIENT
Start: 2018-09-03 | End: 2018-09-03

## 2018-09-03 RX ORDER — AZITHROMYCIN 250 MG/1
TABLET, FILM COATED ORAL
Qty: 6 TAB | Refills: 0 | Status: SHIPPED | OUTPATIENT
Start: 2018-09-03 | End: 2018-09-08

## 2018-09-03 RX ADMIN — IPRATROPIUM BROMIDE AND ALBUTEROL SULFATE 3 ML: .5; 3 SOLUTION RESPIRATORY (INHALATION) at 11:40

## 2018-09-03 RX ADMIN — PREDNISONE 60 MG: 20 TABLET ORAL at 11:40

## 2018-09-03 NOTE — ED NOTES
Patient discharged by Dr. Femi Lee. Patient provided with discharge instructions Rx and instructions on follow up care. Patient out of ED ambulatory accompanied by family.

## 2018-09-03 NOTE — ED PROVIDER NOTES
EMERGENCY DEPARTMENT HISTORY AND PHYSICAL EXAM 
 
 
Date: 9/3/2018 Patient Name: Mik Bejarano History of Presenting Illness Chief Complaint Patient presents with  Cough  
  cough and SOB for a few days History Provided By: Patient and patient's family member HPI: Mik Bejarano, 47 y.o. male with PMHx significant for depression, presents ambulatory to the ED with cc of mild to moderate dry cough x 3 weeks. He reports chest discomfort when coughing. Pt reports SOB over the past few days. Per pt's family member, pt has been wheezing. Pt endorses taking OTC Tussin DM with no relief of cough. He denies recent long distance travel. Pt denies taking any daily medications. He denies associated orthopnea or rhinorrhea. Pt denies PMHx of COPD, heart problems, DM, or blood clots. He denies tobacco use. There are no other complaints, changes, or physical findings at this time. PCP: Meagan Escalera MD 
 
Current Outpatient Prescriptions Medication Sig Dispense Refill  predniSONE (DELTASONE) 20 mg tablet Take 1 Tab by mouth two (2) times a day for 10 doses. With food 10 Tab 0  
 albuterol (PROAIR HFA) 90 mcg/actuation inhaler Take 2 Puffs by inhalation every four (4) hours as needed for Wheezing. 1 Inhaler 0  
 benzonatate (TESSALON) 200 mg capsule Take 1 Cap by mouth three (3) times daily as needed for Cough for up to 7 days. 20 Cap 0  
 azithromycin (ZITHROMAX Z-FELIPE) 250 mg tablet Take 2 tablets by mouth today and then 1 tablet by mouth every day 6 Tab 0 Past History Past Medical History: 
Past Medical History:  
Diagnosis Date  Depression  Headache  Rotator cuff dysfunction 2013 Past Surgical History: 
Past Surgical History:  
Procedure Laterality Date  HX HERNIA REPAIR  05/2018 Family History: 
Family History Problem Relation Age of Onset  Diabetes Mother  Heart Disease Father Social History: 
Social History Substance Use Topics  Smoking status: Never Smoker  Smokeless tobacco: Never Used  Alcohol use No  
   Comment: Occasional  
 
 
Allergies: Allergies Allergen Reactions  Tramadol Anaphylaxis  Ibuprofen Nausea Only  Keflex [Cephalexin] Itching  Morphine Hives  Pcn [Penicillins] Angioedema Review of Systems Review of Systems Constitutional: Negative. Negative for chills and fever. HENT: Negative. Negative for congestion, rhinorrhea and sinus pressure. Eyes: Negative. Negative for discharge and redness. Respiratory: Positive for cough (with chest discomfort) and shortness of breath. Negative for chest tightness. Cardiovascular: Negative. Gastrointestinal: Negative. Negative for abdominal pain and blood in stool. Endocrine: Negative. Genitourinary: Negative. Negative for flank pain and hematuria. Musculoskeletal: Negative. Negative for back pain. Skin: Negative. Negative for rash. Neurological: Negative. Negative for dizziness, seizures, weakness, numbness and headaches. Hematological: Negative. All other systems reviewed and are negative. Physical Exam  
Physical Exam  
Constitutional: He is oriented to person, place, and time. He appears well-developed and well-nourished. No distress. HENT:  
Head: Normocephalic and atraumatic. Nose: Nose normal.  
Mouth/Throat: No oropharyngeal exudate. Eyes: Conjunctivae and EOM are normal. Pupils are equal, round, and reactive to light. No scleral icterus. Neck: Normal range of motion. Neck supple. No JVD present. No thyromegaly present. Cardiovascular: Normal rate, regular rhythm, normal heart sounds, intact distal pulses and normal pulses. PMI is not displaced. Exam reveals no gallop and no friction rub. No murmur heard. Pulmonary/Chest: Effort normal. No stridor. No respiratory distress. He has no decreased breath sounds. He has wheezes. He has no rhonchi. He has no rales. He exhibits no tenderness. Abdominal: Soft. Normal aorta and bowel sounds are normal. He exhibits no distension, no abdominal bruit and no mass. There is no hepatosplenomegaly. There is no tenderness. There is no rebound, no guarding and no CVA tenderness. No hernia. Musculoskeletal: He exhibits no edema. Neurological: He is alert and oriented to person, place, and time. He has normal reflexes. He displays no atrophy and no tremor. No cranial nerve deficit or sensory deficit. He exhibits normal muscle tone. He displays no seizure activity. Coordination normal. GCS eye subscore is 4. GCS verbal subscore is 5. GCS motor subscore is 6. Reflex Scores: 
     Patellar reflexes are 2+ on the right side and 2+ on the left side. Skin: Skin is warm. No rash noted. He is not diaphoretic. No erythema. Nursing note and vitals reviewed. Diagnostic Study Results Labs - Recent Results (from the past 12 hour(s)) EKG, 12 LEAD, INITIAL Collection Time: 09/03/18 11:35 AM  
Result Value Ref Range Ventricular Rate 69 BPM  
 Atrial Rate 69 BPM  
 P-R Interval 154 ms QRS Duration 98 ms Q-T Interval 388 ms QTC Calculation (Bezet) 415 ms Calculated P Axis 31 degrees Calculated R Axis 23 degrees Calculated T Axis 5 degrees Diagnosis Normal sinus rhythm Normal ECG No previous ECGs available Radiologic Studies -  
XR CHEST PA LAT Final Result CXR Results  (Last 48 hours) 09/03/18 1213  XR CHEST PA LAT Final result Impression:  IMPRESSION: No acute process Narrative:  INDICATION:  SOB   
   
COMPARISON: 12/4/2008 FINDINGS: PA and lateral views of the chest demonstrate a stable  
cardiomediastinal silhouette and clear lungs bilaterally. The visualized osseous  
structures are unremarkable. Medical Decision Making I am the first provider for this patient.  
 
I reviewed the vital signs, available nursing notes, past medical history, past surgical history, family history and social history. Vital Signs-Reviewed the patient's vital signs. Patient Vitals for the past 12 hrs: 
 Temp Pulse Resp BP SpO2  
09/03/18 1200 - - - 130/83 99 % 09/03/18 1146 - - - 133/87 -  
09/03/18 1122 97.3 °F (36.3 °C) 72 16 (!) 140/92 100 % Pulse Oximetry Analysis - 100% on room air EKG interpretation: (Preliminary) 11:35 Rhythm: normal sinus rhythm; and regular . Rate (approx.): 69; Axis: normal; MS interval: normal; QRS interval: normal ; ST/T wave: normal; Other findings: normal. 
Written by Macey Ronquillo ED Scribe, as dictated by Ping Mckenzie MD. Records Reviewed: Nursing Notes and Old Medical Records Provider Notes (Medical Decision Making): DDx: CHF, PE, CAD, bronchitis, PNA, reflux Impression/plan: Recurrent nonproductive cough x 3 weeks. No CP at rest but pain when he has a cough. Suspect bronchitis as there is mild wheezing with his cough. Will check CXR. If normal, will treat for bronchitis, f/u with PCP 
 
ED Course:  
Initial assessment performed. The patients presenting problems have been discussed, and they are in agreement with the care plan formulated and outlined with them. I have encouraged them to ask questions as they arise throughout their visit. 12:36 PM 
Pt reports some improvement after nebulizer. Disposition: 
Discharge Note: 
12:57 PM 
The pt is ready for discharge. The pt's signs, symptoms, diagnosis, and discharge instructions have been discussed and pt has conveyed their understanding. The pt is to follow up as recommended or return to ER should their symptoms worsen. Plan has been discussed and pt is in agreement. PLAN: 
1. Current Discharge Medication List  
  
START taking these medications Details  
predniSONE (DELTASONE) 20 mg tablet Take 1 Tab by mouth two (2) times a day for 10 doses. With food 
Qty: 10 Tab, Refills: 0 albuterol (PROAIR HFA) 90 mcg/actuation inhaler Take 2 Puffs by inhalation every four (4) hours as needed for Wheezing. Qty: 1 Inhaler, Refills: 0  
  
benzonatate (TESSALON) 200 mg capsule Take 1 Cap by mouth three (3) times daily as needed for Cough for up to 7 days. Qty: 20 Cap, Refills: 0  
  
azithromycin (ZITHROMAX Z-FELIPE) 250 mg tablet Take 2 tablets by mouth today and then 1 tablet by mouth every day 
Qty: 6 Tab, Refills: 0  
  
  
 
2. Follow-up Information Follow up With Details Comments Contact Info Lauren Fuller MD Schedule an appointment as soon as possible for a visit in 1 week If symptoms worsen [address] [facility] [address] [place] [address] 
[phone] Sonia Odom MD In 2 days  [address]34 [facility] 
[phone] [facility] EMERGENCY DEPT  If symptoms worsen [address] 
[phone] Return to ED if worse Diagnosis Clinical Impression: 1. Acute bronchitis, unspecified organism Attestations: This note is prepared by Honey Hernandez, acting as a Scribe for MD Jack Hensley MD: The scribe's documentation has been prepared under my direction and personally reviewed by me in its entirety. I confirm that the notes above accurately reflects all work, treatment, procedures, and medical decision making performed by me.

## 2018-09-03 NOTE — DISCHARGE INSTRUCTIONS
Bronchitis: Care Instructions  Your Care Instructions    Bronchitis is inflammation of the bronchial tubes, which carry air to the lungs. The tubes swell and produce mucus, or phlegm. The mucus and inflamed bronchial tubes make you cough. You may have trouble breathing. Most cases of bronchitis are caused by viruses like those that cause colds. Antibiotics usually do not help and they may be harmful. Bronchitis usually develops rapidly and lasts about 2 to 3 weeks in otherwise healthy people. Follow-up care is a key part of your treatment and safety. Be sure to make and go to all appointments, and call your doctor if you are having problems. It's also a good idea to know your test results and keep a list of the medicines you take. How can you care for yourself at home? · Take all medicines exactly as prescribed. Call your doctor if you think you are having a problem with your medicine. · Get some extra rest.  · Take an over-the-counter pain medicine, such as acetaminophen (Tylenol), ibuprofen (Advil, Motrin), or naproxen (Aleve) to reduce fever and relieve body aches. Read and follow all instructions on the label. · Do not take two or more pain medicines at the same time unless the doctor told you to. Many pain medicines have acetaminophen, which is Tylenol. Too much acetaminophen (Tylenol) can be harmful. · Take an over-the-counter cough medicine that contains dextromethorphan to help quiet a dry, hacking cough so that you can sleep. Avoid cough medicines that have more than one active ingredient. Read and follow all instructions on the label. · Breathe moist air from a humidifier, hot shower, or sink filled with hot water. The heat and moisture will thin mucus so you can cough it out. · Do not smoke. Smoking can make bronchitis worse. If you need help quitting, talk to your doctor about stop-smoking programs and medicines. These can increase your chances of quitting for good.   When should you call for help? Call 911 anytime you think you may need emergency care. For example, call if:    · You have severe trouble breathing.    Call your doctor now or seek immediate medical care if:    · You have new or worse trouble breathing.     · You cough up dark brown or bloody mucus (sputum).     · You have a new or higher fever.     · You have a new rash.    Watch closely for changes in your health, and be sure to contact your doctor if:    · You cough more deeply or more often, especially if you notice more mucus or a change in the color of your mucus.     · You are not getting better as expected. Where can you learn more? Go to http://jesse-trena.info/. Enter H333 in the search box to learn more about \"Bronchitis: Care Instructions. \"  Current as of: December 6, 2017  Content Version: 11.7  © 8560-7853 [employer]. Care instructions adapted under license by [employer] (which disclaims liability or warranty for this information). If you have questions about a medical condition or this instruction, always ask your healthcare professional. [address] any warranty or liability for your use of this information.

## 2018-09-03 NOTE — ED TRIAGE NOTES
Assumed care of this patient. He is alert and oriented x4. Patient ambulatory with c/o cough x3 and shortness of breath. Patient denies fever, chills, nausea, vomiting or diarrhea at home.

## 2018-09-04 LAB
ATRIAL RATE: 69 BPM
CALCULATED P AXIS, ECG09: 31 DEGREES
CALCULATED R AXIS, ECG10: 23 DEGREES
CALCULATED T AXIS, ECG11: 5 DEGREES
DIAGNOSIS, 93000: NORMAL
P-R INTERVAL, ECG05: 154 MS
Q-T INTERVAL, ECG07: 388 MS
QRS DURATION, ECG06: 98 MS
QTC CALCULATION (BEZET), ECG08: 415 MS
VENTRICULAR RATE, ECG03: 69 BPM

## 2018-10-26 ENCOUNTER — OFFICE VISIT (OUTPATIENT)
Dept: INTERNAL MEDICINE CLINIC | Age: 53
End: 2018-10-26

## 2018-10-26 VITALS
BODY MASS INDEX: 31.41 KG/M2 | DIASTOLIC BLOOD PRESSURE: 86 MMHG | SYSTOLIC BLOOD PRESSURE: 127 MMHG | RESPIRATION RATE: 20 BRPM | WEIGHT: 237 LBS | HEART RATE: 74 BPM | HEIGHT: 73 IN | TEMPERATURE: 98.7 F | OXYGEN SATURATION: 100 %

## 2018-10-26 DIAGNOSIS — M25.571 RIGHT ANKLE PAIN, UNSPECIFIED CHRONICITY: Primary | ICD-10-CM

## 2018-10-26 RX ORDER — PREDNISONE 20 MG/1
40 TABLET ORAL
Qty: 10 TAB | Refills: 0 | Status: SHIPPED | OUTPATIENT
Start: 2018-10-26 | End: 2018-10-31

## 2018-10-26 NOTE — PROGRESS NOTES
HISTORY OF PRESENT ILLNESS Polly Mahajan is a 47 y.o. male. Ankle Pain The history is provided by the patient. This is a new problem. The current episode started more than 1 week ago (3 weeks). The problem occurs hourly. The problem has not changed since onset. The pain is present in the right ankle. The pain is moderate. Associated symptoms include stiffness. The symptoms are aggravated by movement. He has tried arthritis medications for the symptoms. There has been no history of extremity trauma. Finished a course of antibiotics and steroids 2 weeks ago for acute bronchitis. No history of gout. Review of Systems Cardiovascular: Negative for leg swelling. Musculoskeletal: Positive for stiffness. Negative for falls. Skin: Negative for rash. Social History Socioeconomic History  Marital status:  Spouse name: Not on file  Number of children: 3  
 Years of education: Not on file  Highest education level: Not on file Social Needs  Financial resource strain: Not on file  Food insecurity - worry: Not on file  Food insecurity - inability: Not on file  Transportation needs - medical: Not on file  Transportation needs - non-medical: Not on file Occupational History  Occupation: break shoes Tobacco Use  Smoking status: Never Smoker  Smokeless tobacco: Never Used Substance and Sexual Activity  Alcohol use: No  
  Alcohol/week: 0.0 oz  
  Comment: Occasional  
 Drug use: No  
 Sexual activity: Yes  
  Partners: Female Other Topics Concern  Not on file Social History Narrative Living with wife and 3 kids Physical Exam 
Visit Vitals /86 (BP 1 Location: Right arm, BP Patient Position: Sitting) Pulse 74 Temp 98.7 °F (37.1 °C) (Oral) Resp 20 Ht 6' 1\" (1.854 m) Wt 237 lb (107.5 kg) SpO2 100% BMI 31.27 kg/m² WD WN male NAD Heart RRR without murmers clicks or rubs Lungs CTA Abdo soft nontender Ext no edema, minimal redness or swelling ASSESSMENT and PLAN Encounter Diagnoses Name Primary?  Right ankle pain, unspecified chronicity Yes Orders Placed This Encounter  XR ANKLE RT MIN 3 V  
 predniSONE (DELTASONE) 20 mg tablet Subacute gout. Follow-up Disposition: 
Return if symptoms worsen or fail to improve.

## 2018-10-26 NOTE — PROGRESS NOTES
Chief Complaint Patient presents with  Ankle Pain RIGHT SIDE; DENIES INJURY 1. Have you been to the ER, urgent care clinic since your last visit? Hospitalized since your last visit? No 
 
2. Have you seen or consulted any other health care providers outside of the [address] [place] [address] since your last visit? Include any pap smears or colon screening. No  
 
Health Maintenance Due Topic Date Due  Shingrix Vaccine Age 50> (1 of 2) 04/10/2016  FOBT Q 1 YEAR AGE 50-75  04/10/2016  Influenza Age 5 to Adult  08/01/2018 Patient refuses flu vaccine.

## 2018-10-26 NOTE — LETTER
NOTIFICATION OF RETURN TO WORK 
10/26/2018 4:29 PM 
 
Mr. Verónica Jesus [address] [id number] Lexii Collier To Whom It May Concern: 
 
Verónica Jesus was under the care of [address] He will be able to return to work on November 2, 2018. If there are questions or concerns please have the patient contact our office. Sincerely, Jeanette Orr MD

## 2019-01-04 ENCOUNTER — OFFICE VISIT (OUTPATIENT)
Dept: INTERNAL MEDICINE CLINIC | Age: 54
End: 2019-01-04

## 2019-01-04 VITALS
RESPIRATION RATE: 20 BRPM | SYSTOLIC BLOOD PRESSURE: 123 MMHG | DIASTOLIC BLOOD PRESSURE: 85 MMHG | HEIGHT: 73 IN | BODY MASS INDEX: 30.88 KG/M2 | TEMPERATURE: 98 F | OXYGEN SATURATION: 98 % | WEIGHT: 233 LBS | HEART RATE: 70 BPM

## 2019-01-04 DIAGNOSIS — M25.571 RIGHT ANKLE PAIN, UNSPECIFIED CHRONICITY: Primary | ICD-10-CM

## 2019-01-04 DIAGNOSIS — M10.00 ACUTE IDIOPATHIC GOUT, UNSPECIFIED SITE: ICD-10-CM

## 2019-01-04 DIAGNOSIS — J01.00 SUBACUTE MAXILLARY SINUSITIS: ICD-10-CM

## 2019-01-04 RX ORDER — COLCHICINE 0.6 MG/1
0.6 TABLET ORAL DAILY
Qty: 30 TAB | Refills: 1 | Status: SHIPPED | OUTPATIENT
Start: 2019-01-04 | End: 2019-03-13 | Stop reason: SDUPTHER

## 2019-01-04 RX ORDER — HYDROCODONE BITARTRATE AND ACETAMINOPHEN 5; 325 MG/1; MG/1
1 TABLET ORAL
Qty: 12 TAB | Refills: 0 | Status: SHIPPED | OUTPATIENT
Start: 2019-01-04 | End: 2019-03-13 | Stop reason: ALTCHOICE

## 2019-01-04 RX ORDER — PREDNISONE 20 MG/1
40 TABLET ORAL
Qty: 18 TAB | Refills: 0 | Status: SHIPPED | OUTPATIENT
Start: 2019-01-04 | End: 2019-03-13 | Stop reason: ALTCHOICE

## 2019-01-04 NOTE — PROGRESS NOTES
Chief Complaint Patient presents with  Sinus Pain X 2 WEEKS. CHEST CONGESTION. TRIED OTC NYQUIL  Ankle Pain X 2 WEEKS 1. Have you been to the ER, urgent care clinic since your last visit? Hospitalized since your last visit? No 
 
2. Have you seen or consulted any other health care providers outside of the [address] [place] [address] since your last visit? Include any pap smears or colon screening. No  
 
Health Maintenance Due Topic Date Due  Shingrix Vaccine Age 50> (1 of 2) 04/10/2016  FOBT Q 1 YEAR AGE 50-75  04/10/2016  Influenza Age 5 to Adult  08/01/2018

## 2019-01-04 NOTE — PROGRESS NOTES
HISTORY OF PRESENT ILLNESS Virgilio Amin is a 47 y.o. male. Sinus Pain The history is provided by the patient. This is a new problem. The current episode started more than 1 week ago. The problem has not changed since onset. There has been no fever. The pain is moderate. Associated symptoms include cough. He has tried decongestants for the symptoms. Ankle Pain This is a new problem. Episode onset: 2 weeks. The problem has not changed since onset. The pain is present in the right ankle. The pain is moderate. Associated symptoms include stiffness. Treatments tried: prednsione. The treatment provided no relief. Recommended an x-ray, he did not get it but he has not fallen and no other joints are affected. No history of gout. Previous visit was thought to be gout. Allergies Allergen Reactions  Tramadol Anaphylaxis  Ibuprofen Nausea Only  Keflex [Cephalexin] Itching  Morphine Hives  Pcn [Penicillins] Angioedema Social History Socioeconomic History  Marital status:  Spouse name: Not on file  Number of children: 3  
 Years of education: Not on file  Highest education level: Not on file Social Needs  Financial resource strain: Not on file  Food insecurity - worry: Not on file  Food insecurity - inability: Not on file  Transportation needs - medical: Not on file  Transportation needs - non-medical: Not on file Occupational History  Occupation: break shoes Tobacco Use  Smoking status: Never Smoker  Smokeless tobacco: Never Used Substance and Sexual Activity  Alcohol use: No  
  Alcohol/week: 0.0 oz  
  Comment: Occasional  
 Drug use: No  
 Sexual activity: Yes  
  Partners: Female Other Topics Concern  Not on file Social History Narrative Living with wife and 3 kids Wants to return to work, he is afraid he will lose his job. His Federico is upset. Review of Systems Constitutional: Negative for fever. HENT: Positive for sinus pain. Respiratory: Positive for cough. Negative for hemoptysis. Cardiovascular: Negative for leg swelling. Musculoskeletal: Positive for joint pain and stiffness. Negative for falls. Foot is not affected just the ankle. Physical Exam 
Visit Vitals /85 (BP 1 Location: Right arm, BP Patient Position: Sitting) Pulse 70 Temp 98 °F (36.7 °C) (Oral) Resp 20 Ht 6' 1\" (1.854 m) Wt 233 lb (105.7 kg) SpO2 98% BMI 30.74 kg/m² WD WN male NAD Heart RRR without murmers clicks or rubs Lungs CTA Abdo soft nontender Ext no edema joint does not look particularly swollen or red minimal asymmetry between the 2 ankle joints ASSESSMENT and PLAN Encounter Diagnoses Name Primary?  Right ankle pain, unspecified chronicity Yes  Acute idiopathic gout, unspecified site  Subacute maxillary sinusitis Orders Placed This Encounter  XR ANKLE RT MIN 3 V  
 predniSONE (DELTASONE) 20 mg tablet  colchicine (COLCRYS) 0.6 mg tablet  HYDROcodone-acetaminophen (NORCO) 5-325 mg per tablet Going to treat this as a suspected gout, get x-ray. We discussed this pain and the usage of controlled substances for acutegout pain relief. In general these medications are indicated for the acute symptom relief and not for chronic usage, allthough they are frequently used for chronic management  After a certain period of time they should be stopped to avoid dependence and/or addiction. I warned him about the dangers of addiction and other side affects including respiratory depression and death. Discussed how this is a controlled substance and that the prescribing of it is should be monitored very carefully. Drug usage is also monitored by our practise and the [facility], since there has been a lot of abuse and diversion of controlled substances. In general we do not refill this medication over the phone. PLease ask for enough pills to get you to your next appointment and make sure you keep your appointments. Warned not to take other medications like alcohol, other benzodiazapines marijuana or other narcotics when on this medication. Sinuses multiple intolerances to antibiotics trial of another course of steroids to see if that will help with his sinuses would avoid antibiotics given his allergies. Current Outpatient Medications Medication Sig Dispense Refill  predniSONE (DELTASONE) 20 mg tablet Take 40 mg by mouth daily (with breakfast). Then 1 tab daily for 5 days then half a tab daily  till done 18 Tab 0  
 colchicine (COLCRYS) 0.6 mg tablet Take 1 Tab by mouth daily. 30 Tab 1  
 HYDROcodone-acetaminophen (NORCO) 5-325 mg per tablet Take 1 Tab by mouth every six (6) hours as needed for Pain. Max Daily Amount: 4 Tabs. 12 Tab 0  
 albuterol (PROAIR HFA) 90 mcg/actuation inhaler Take 2 Puffs by inhalation every four (4) hours as needed for Wheezing. 1 Inhaler 0 Follow-up Disposition: 
Return in about 2 weeks (around 1/18/2019) for routine follow up.

## 2019-03-13 ENCOUNTER — OFFICE VISIT (OUTPATIENT)
Dept: INTERNAL MEDICINE CLINIC | Age: 54
End: 2019-03-13

## 2019-03-13 VITALS
RESPIRATION RATE: 20 BRPM | SYSTOLIC BLOOD PRESSURE: 132 MMHG | BODY MASS INDEX: 30.88 KG/M2 | OXYGEN SATURATION: 98 % | HEART RATE: 76 BPM | DIASTOLIC BLOOD PRESSURE: 87 MMHG | HEIGHT: 73 IN | TEMPERATURE: 98.1 F | WEIGHT: 233 LBS

## 2019-03-13 DIAGNOSIS — G89.29 CHRONIC PAIN OF RIGHT ANKLE: Primary | ICD-10-CM

## 2019-03-13 DIAGNOSIS — M25.571 CHRONIC PAIN OF RIGHT ANKLE: Primary | ICD-10-CM

## 2019-03-13 RX ORDER — INDOMETHACIN 25 MG/1
50 CAPSULE ORAL 3 TIMES DAILY
Qty: 60 CAP | Refills: 1 | Status: SHIPPED | OUTPATIENT
Start: 2019-03-13 | End: 2019-03-23

## 2019-03-13 RX ORDER — COLCHICINE 0.6 MG/1
0.6 TABLET ORAL DAILY
Qty: 30 TAB | Refills: 1 | Status: SHIPPED | OUTPATIENT
Start: 2019-03-13 | End: 2019-04-16 | Stop reason: CLARIF

## 2019-03-13 NOTE — PROGRESS NOTES
HISTORY OF PRESENT ILLNESS  Robbie Gallardo is a 47 y.o. male. Ankle swelling    The history is provided by the patient. This is a chronic problem. Episode onset: months. The problem occurs daily. The problem has been gradually worsening. The pain is present in the right ankle. The quality of the pain is described as constant. The pain is severe. Pertinent negatives include no numbness and full range of motion. The symptoms are aggravated by standing, activity and movement. Treatments tried: prednisone colcrys HC. The treatment provided no (missed a lot of work and in danger of being fired) relief. There has been no history of extremity trauma. Review of Systems   Musculoskeletal: Positive for joint pain. Negative for falls. Left ankle OK   Neurological: Negative for numbness.      Allergies   Allergen Reactions    Tramadol Anaphylaxis    Ibuprofen Nausea Only    Keflex [Cephalexin] Itching    Morphine Hives    Pcn [Penicillins] Angioedema     Social History     Socioeconomic History    Marital status:      Spouse name: Not on file    Number of children: 3    Years of education: Not on file    Highest education level: Not on file   Social Needs    Financial resource strain: Not on file    Food insecurity - worry: Not on file    Food insecurity - inability: Not on file   [employer] needs - medical: Not on file   [employer] needs - non-medical: Not on file   Occupational History    Occupation: break shoes   Tobacco Use    Smoking status: Never Smoker    Smokeless tobacco: Never Used   Substance and Sexual Activity    Alcohol use: No     Alcohol/week: 0.0 oz     Comment: Occasional    Drug use: No    Sexual activity: Yes     Partners: Female   Other Topics Concern    Not on file   Social History Narrative    Living with wife and 3 kids       Physical Exam  Visit Vitals  /87 (BP 1 Location: Left arm, BP Patient Position: Sitting)   Pulse 76   Temp 98.1 °F (36.7 °C) (Oral)   Resp 20   Ht 6' 1\" (1.854 m)   Wt 233 lb (105.7 kg)   SpO2 98%   BMI 30.74 kg/m²     WD WN male NAD  Heart RRR without murmers clicks or rubs  Lungs CTA  Abdo soft nontender  Ext no edema  No redness or tenderness to palp NV intact  ASSESSMENT and PLAN  Encounter Diagnoses   Name Primary?  Chronic pain of right ankle Yes     Orders Placed This Encounter    AMB SUPPLY ORDER    XR ANKLE RT MIN 3 V    indomethacin (INDOCIN) 25 mg capsule    colchicine (COLCRYS) 0.6 mg tablet     Follow-up Disposition:  Return in about 1 week (around 3/20/2019).

## 2019-03-13 NOTE — LETTER
NOTIFICATION RETURN TO WORK  
 
3/13/2019 3:45 PM 
 
Mr. Sabra Reed [address] [address] To Whom It May Concern: 
 
Sabra Reed is currently under the care of [address]. He will return to work on: 03/25/2018 If there are questions or concerns please have the patient contact our office. Sincerely, Ivelisse Eldridge MD

## 2019-03-15 ENCOUNTER — TELEPHONE (OUTPATIENT)
Dept: INTERNAL MEDICINE CLINIC | Age: 54
End: 2019-03-15

## 2019-03-15 ENCOUNTER — DOCUMENTATION ONLY (OUTPATIENT)
Dept: INTERNAL MEDICINE CLINIC | Age: 54
End: 2019-03-15

## 2019-03-15 ENCOUNTER — OFFICE VISIT (OUTPATIENT)
Dept: INTERNAL MEDICINE CLINIC | Age: 54
End: 2019-03-15

## 2019-03-15 VITALS
SYSTOLIC BLOOD PRESSURE: 120 MMHG | BODY MASS INDEX: 30.88 KG/M2 | DIASTOLIC BLOOD PRESSURE: 87 MMHG | HEART RATE: 62 BPM | HEIGHT: 73 IN | TEMPERATURE: 98 F | RESPIRATION RATE: 16 BRPM | WEIGHT: 233 LBS | OXYGEN SATURATION: 99 %

## 2019-03-15 DIAGNOSIS — M25.571 ACUTE RIGHT ANKLE PAIN: Primary | ICD-10-CM

## 2019-03-15 DIAGNOSIS — M10.00 IDIOPATHIC GOUT, UNSPECIFIED CHRONICITY, UNSPECIFIED SITE: ICD-10-CM

## 2019-03-15 RX ORDER — HYDROCODONE BITARTRATE AND ACETAMINOPHEN 5; 325 MG/1; MG/1
1 TABLET ORAL
Qty: 20 TAB | Refills: 0 | Status: SHIPPED | OUTPATIENT
Start: 2019-03-15 | End: 2019-03-20

## 2019-03-15 RX ORDER — TRIAMCINOLONE ACETONIDE 1 MG/G
CREAM TOPICAL 2 TIMES DAILY
Qty: 30 G | Refills: 0 | Status: SHIPPED | OUTPATIENT
Start: 2019-03-15 | End: 2019-04-16 | Stop reason: CLARIF

## 2019-03-15 NOTE — PROGRESS NOTES
HISTORY OF PRESENT ILLNESS  Talia Kumar is a 47 y.o. male. Ankle Pain    The history is provided by the patient and spouse. This is a recurrent problem. The current episode started more than 1 week ago. The problem occurs constantly. The problem has been gradually worsening. The pain is present in the right ankle. The quality of the pain is described as sharp. The pain is severe. Associated symptoms include itching. Treatments tried: Colcrys, steroids, NSAIDs. The treatment provided no relief. There has been no history of extremity trauma. Patient Active Problem List   Diagnosis Code    Ventral hernia without obstruction or gangrene K43.9       Review of Systems   Constitutional: Negative for fever. Musculoskeletal: Negative for falls. Skin: Positive for itching and rash.      Social History     Socioeconomic History    Marital status:      Spouse name: Not on file    Number of children: 3    Years of education: Not on file    Highest education level: Not on file   Social Needs    Financial resource strain: Not on file    Food insecurity - worry: Not on file    Food insecurity - inability: Not on file   [employer] needs - medical: Not on file   [employer] needs - non-medical: Not on file   Occupational History    Occupation: break shoes   Tobacco Use    Smoking status: Never Smoker    Smokeless tobacco: Never Used   Substance and Sexual Activity    Alcohol use: No     Alcohol/week: 0.0 oz     Comment: Occasional    Drug use: No    Sexual activity: Yes     Partners: Female   Other Topics Concern    Not on file   Social History Narrative    Living with wife and 3 kids         Physical Exam  Visit Vitals  /87 (BP 1 Location: Left arm, BP Patient Position: Sitting)   Pulse 62   Temp 98 °F (36.7 °C) (Oral)   Resp 16   Ht 6' 1\" (1.854 m)   Wt 233 lb (105.7 kg)   SpO2 99%   BMI 30.74 kg/m²     WD WN male NAD  Heart RRR without murmers clicks or rubs  Lungs CTA  Abdo soft nontender  Ext no edema, there is some erythema see scanned picture    ASSESSMENT and PLAN  Encounter Diagnoses   Name Primary?  Acute right ankle pain Yes    Idiopathic gout, unspecified chronicity, unspecified site      Orders Placed This Encounter    REFERRAL TO ORTHOPEDICS    HYDROcodone-acetaminophen (NORCO) 5-325 mg per tablet    triamcinolone acetonide (KENALOG) 0.1 % topical cream       We discussed this pain and the usage of controlled substances for ankle pain relief. In general these medications are indicated for the acute symptom relief and not for chronic usage, allthough they are frequently used for chronic management  After a certain period of time they should be stopped to avoid dependence and/or addiction. I warned him about the dangers of addiction and other side affects including respiratory depression and death. Discussed how this is a controlled substance and that the prescribing of it is should be monitored very carefully. Drug usage is also monitored by our practise and the [facility], since there has been a lot of abuse and diversion of controlled substances. In general we do not refill this medication over the phone. PLease ask for enough pills to get you to your next appointment and make sure you keep your appointments. Warned not to take other medications like alcohol, other benzodiazapines marijuana or other narcotics when on this medication. We will get him in to see orthopedics, appointment made for next week.   Follow-up Disposition: Not on File

## 2019-03-15 NOTE — PROGRESS NOTES
Chief Complaint   Patient presents with    Ankle Pain     [address]     Appointment scheduled for Thursday, March 21, 2019 at 8 am at the [facility] location. Patient has been made aware of appointment in the office.

## 2019-03-15 NOTE — PROGRESS NOTES
Chief Complaint   Patient presents with    Ankle Pain     R/ankle pain     I have reviewed the patient's medical history in detail and updated the computerized patient record. Health Maintenance reviewed. 1. Have you been to the ER, urgent care clinic since your last visit? Hospitalized since your last visit?no    2. Have you seen or consulted any other health care providers outside of the [facility] since your last visit? Include any pap smears or colon screening. No      Encouraged pt to discuss pt's wishes with spouse/partner/family and bring them in the next appt to follow thru with the Advanced Directive    Fall Risk Assessment, last 12 mths 3/15/2019   Able to walk? Yes   Fall in past 12 months? No       3 most recent PHQ Screens 3/15/2019   Little interest or pleasure in doing things Several days   Feeling down, depressed, irritable, or hopeless Several days   Total Score PHQ 2 2       Abuse Screening Questionnaire 3/15/2019   Do you ever feel afraid of your partner? N   Are you in a relationship with someone who physically or mentally threatens you? N   Is it safe for you to go home?  Y       ADL Assessment 3/15/2019   Feeding yourself No Help Needed   Getting from bed to chair No Help Needed   Getting dressed No Help Needed   Bathing or showering No Help Needed   Walk across the room (includes cane/walker) No Help Needed   Using the telphone No Help Needed   Taking your medications No Help Needed   Preparing meals No Help Needed   Managing money (expenses/bills) No Help Needed   Moderately strenuous housework (laundry) No Help Needed   Shopping for personal items (toiletries/medicines) No Help Needed   Shopping for groceries No Help Needed   Driving Help Needed   Climbing a flight of stairs No Help Needed   Getting to places beyond walking distances Help Needed                   Encouraged pt to discuss pt's wishes with spouse/partner/family and bring them in the next appt to follow thru with the Advanced Directive

## 2019-03-15 NOTE — TELEPHONE ENCOUNTER
verified. Informed patient that an appointment is required for change of medication. Appointment scheduled.

## 2019-03-15 NOTE — TELEPHONE ENCOUNTER
Pt called in reference to medication Dr. Rica Hobson gave him for his ankle is not working. He would like to be called at [phone].

## 2019-03-19 ENCOUNTER — DOCUMENTATION ONLY (OUTPATIENT)
Dept: INTERNAL MEDICINE CLINIC | Age: 54
End: 2019-03-19

## 2019-03-19 NOTE — PROGRESS NOTES
1st call in ref to referral [facility] mail box is full and is unable to receive messages.

## 2019-04-16 ENCOUNTER — HOSPITAL ENCOUNTER (EMERGENCY)
Age: 54
Discharge: HOME | End: 2019-04-16
Attending: EMERGENCY MEDICINE
Payer: COMMERCIAL

## 2019-04-16 ENCOUNTER — APPOINTMENT (OUTPATIENT)
Dept: GENERAL RADIOLOGY | Age: 54
End: 2019-04-16
Attending: EMERGENCY MEDICINE
Payer: COMMERCIAL

## 2019-04-16 VITALS
HEART RATE: 71 BPM | DIASTOLIC BLOOD PRESSURE: 83 MMHG | BODY MASS INDEX: 29.98 KG/M2 | RESPIRATION RATE: 16 BRPM | TEMPERATURE: 97.6 F | WEIGHT: 226.19 LBS | HEIGHT: 73 IN | SYSTOLIC BLOOD PRESSURE: 136 MMHG | OXYGEN SATURATION: 100 %

## 2019-04-16 DIAGNOSIS — M25.571 ACUTE RIGHT ANKLE PAIN: ICD-10-CM

## 2019-04-16 DIAGNOSIS — M25.511 ACUTE PAIN OF RIGHT SHOULDER: Primary | ICD-10-CM

## 2019-04-16 DIAGNOSIS — M15.9 PRIMARY OSTEOARTHRITIS INVOLVING MULTIPLE JOINTS: ICD-10-CM

## 2019-04-16 PROCEDURE — 73030 X-RAY EXAM OF SHOULDER: CPT

## 2019-04-16 PROCEDURE — 73610 X-RAY EXAM OF ANKLE: CPT

## 2019-04-16 PROCEDURE — 99282 EMERGENCY DEPT VISIT SF MDM: CPT

## 2019-04-16 PROCEDURE — 74011250637 HC RX REV CODE- 250/637: Performed by: PHYSICIAN ASSISTANT

## 2019-04-16 RX ORDER — HYDROCODONE BITARTRATE AND ACETAMINOPHEN 5; 325 MG/1; MG/1
1 TABLET ORAL
Qty: 10 TAB | Refills: 0 | Status: SHIPPED | OUTPATIENT
Start: 2019-04-16 | End: 2019-04-21

## 2019-04-16 RX ORDER — OXYCODONE AND ACETAMINOPHEN 5; 325 MG/1; MG/1
1 TABLET ORAL
Status: COMPLETED | OUTPATIENT
Start: 2019-04-16 | End: 2019-04-16

## 2019-04-16 RX ADMIN — OXYCODONE AND ACETAMINOPHEN 1 TABLET: 5; 325 TABLET ORAL at 17:03

## 2019-04-16 NOTE — DISCHARGE INSTRUCTIONS
Rest, gentle stretching. Follow up with primary care for recheck. Contact information provided for Orthopedist if symptoms persist.  Return to the Emergency Dept for any concerns.

## 2019-04-16 NOTE — PROGRESS NOTES
Send normal/stable results letter. Your results are normal/stable. If not signed up, consider getting my chart to get your results on-line. We can help you to sign up. X-ray shows some arthritis.

## 2019-04-16 NOTE — LETTER
[address] 
[facility] EMERGENCY DEPT 
[address] [address] 
[phone] Work/School Note Date: 4/16/2019 To Whom It May concern: 
 
Alberta Shields was seen and treated today in the emergency room. He may return to work in 1 to 2 days, as symptoms improve. Sincerely, Olayinka Mariscal

## 2019-04-16 NOTE — ED NOTES
Assumed care of patient. Patient is alert and oriented and is ambulatory or ambulatory with minimal assistance. Patient is evaluated by mid-level provider in the JET express procedure of the Emergency Department. The Patient is made aware this nurse is available for any assistance at any point of the JET express process. Family and/or caregiver is encouraged to be present. Focus Note: Patient c/o right shoulder pain r/t torn rotator cuff and right ankle swelling x three weeks; denies injury Physical Assessment: 
 
Neuro: WDL Cardiac: WDL Resp: WDL PVS: WDL 
GI/: WDL Skin: WDL 
ENT: WDL Musculoskeletal: see note

## 2019-04-16 NOTE — ED PROVIDER NOTES
EMERGENCY DEPARTMENT HISTORY AND PHYSICAL EXAM 
 
 
Date: 4/16/2019 Patient Name: Nacho Evans History of Presenting Illness Chief Complaint Patient presents with  Shoulder Pain Patient complain of right shoulder pain and has a confirmed diagnosis of \"torn rotator cuff\" last X Ray was one year ago  Ankle Pain Patient also complain of right ankle swelling and pain Denies any injury History Provided By: Patient HPI: Nacho Evans, 49 y.o. male presents ambulatory to the ED with c/o exac of chronic R shoulder pain over the last few days. He reports a h/o a torn rotator cuff which was dx'd a year ago. Pt denied new injury. No trauma/strain or change in his physical routine. He has also noted increased pain to the R ankle. He c/o mild ankle swelling. No redness or warmth. Pt states he can ambulate/weight bear with tenderness. He denied chest pain or shortness of breath. He denied numbness/tingling. No weakness. Chief Complaint: R shoulder and R ankle pain Duration: 1-2 Days Timing:  Acute on chronic R shoulder pain, acute R ankle pain Location: R shoulder, R ankle Quality: Aching Severity: Moderate Modifying Factors: increased pain with movement Associated Symptoms: denies any other associated signs or symptoms There are no other complaints, changes, or physical findings at this time. PCP: Richard Lara MD 
 
Current Outpatient Medications Medication Sig Dispense Refill  
 HYDROcodone-acetaminophen (LORTAB 5-325) 5-325 mg per tablet Take 1 Tab by mouth every four (4) hours as needed for Pain for up to 5 days. Max Daily Amount: 6 Tabs. 10 Tab 0 Past History Past Medical History: 
Past Medical History:  
Diagnosis Date  Depression  Headache  Rotator cuff dysfunction 2013 Past Surgical History: 
Past Surgical History:  
Procedure Laterality Date  HX HERNIA REPAIR  05/2018 Family History: 
Family History Problem Relation Age of Onset  Diabetes Mother  Heart Disease Father Social History: 
Social History Tobacco Use  Smoking status: Never Smoker  Smokeless tobacco: Never Used Substance Use Topics  Alcohol use: No  
  Alcohol/week: 0.0 oz  
  Comment: Occasional  
 Drug use: No  
 
 
Allergies: Allergies Allergen Reactions  Tramadol Anaphylaxis  Ibuprofen Nausea Only  Keflex [Cephalexin] Itching  Morphine Hives  Pcn [Penicillins] Angioedema Review of Systems Review of Systems Constitutional: Negative for chills and fever. HENT: Negative for congestion, rhinorrhea and sore throat. Respiratory: Negative for cough and shortness of breath. Cardiovascular: Negative for chest pain and palpitations. Gastrointestinal: Negative for diarrhea, nausea and vomiting. Endocrine: Negative for polydipsia, polyphagia and polyuria. Genitourinary: Negative for dysuria and hematuria. Musculoskeletal: Positive for arthralgias. Negative for neck pain and neck stiffness. Skin: Negative for rash and wound. Allergic/Immunologic: Negative for food allergies and immunocompromised state. Neurological: Negative for weakness and numbness. Hematological: Negative for adenopathy. Does not bruise/bleed easily. Psychiatric/Behavioral: Negative for agitation and confusion. Physical Exam  
Physical Exam  
Constitutional: He is oriented to person, place, and time. He appears well-developed and well-nourished. No distress. WDWN AA male, alert, in NAD HENT:  
Head: Normocephalic and atraumatic. Nose: Nose normal.  
Mouth/Throat: No oropharyngeal exudate. Eyes: Conjunctivae and EOM are normal. Right eye exhibits no discharge. Left eye exhibits no discharge. No scleral icterus. Neck: Normal range of motion. Neck supple. No JVD present. No tracheal deviation present. No thyromegaly present. No cervical spinal tenderness Cardiovascular: Normal rate, regular rhythm, normal heart sounds and intact distal pulses. Pulmonary/Chest: Effort normal and breath sounds normal. No respiratory distress. He has no wheezes. He exhibits no tenderness. Abdominal: Soft. Bowel sounds are normal. He exhibits no distension. There is no tenderness. There is no rebound and no guarding. Musculoskeletal: He exhibits tenderness. He exhibits no edema. Decreased A/P ROM to R shoulder and R ankle due to tenderness with palpation/movement, no deformity, no crepitus, no erythema/rash/lesion/heat. 2+ distal pulses, NVI, sensation grossly intact to light touch. No spinal tenderness. Lymphadenopathy:  
  He has no cervical adenopathy. Neurological: He is alert and oriented to person, place, and time. He exhibits normal muscle tone. Coordination normal.  
Skin: Skin is warm and dry. He is not diaphoretic. No pallor. Psychiatric: He has a normal mood and affect. His behavior is normal.  
Nursing note and vitals reviewed. Diagnostic Study Results Labs - No results found for this or any previous visit (from the past 12 hour(s)). Radiologic Studies -  
XR SHOULDER RT AP/LAT MIN 2 V Final Result IMPRESSION:  
  
Mild to moderate osteoarthritis XR ANKLE RT MIN 3 V Final Result IMPRESSION: Evidence of degenerative change involving the right ankle and  
proximal foot as described above. Medical Decision Making I am the first provider for this patient. I reviewed the vital signs, available nursing notes, past medical history, past surgical history, family history and social history. Vital Signs-Reviewed the patient's vital signs. Patient Vitals for the past 12 hrs: 
 Temp Pulse Resp BP SpO2  
04/16/19 1609 97.6 °F (36.4 °C) 71 16 136/83 100 % Records Reviewed: Nursing Notes, Old Medical Records, Previous Radiology Studies and Previous Laboratory Studies Provider Notes (Medical Decision Making): Rotator cuff d/o, DJD, OA, fx, AC separation ED Course:  
Initial assessment performed. The patients presenting problems have been discussed, and they are in agreement with the care plan formulated and outlined with them. I have encouraged them to ask questions as they arise throughout their visit. DISCHARGE NOTE: 
The care plan has been outline with the patient and/or family, who verbally conveyed understanding and agreement. Available results have been reviewed. Patient and/or family understand the follow up plan as outlined and discharge instructions. Should their condition deterioration at any time after discharge the patient agrees to return, follow up sooner than outlined or seek medical assistance at the closest Emergency Room as soon as possible. Questions have been answered. Discharge instructions and educational information regarding the patient's diagnosis as well a list of reasons why the patient would want to seek immediate medical attention, should their condition change, were reviewed directly with the patient/family PLAN: 
1. There are no discharge medications for this patient. 2.  
Follow-up Information Follow up With Specialties Details Why Contact Info Dony Mccabe MD [facility]   [address] 
[phone] Manisha Shin MD Orthopedic Surgery  As needed [address] [facility] [address] [facility] 
[phone] \[facility] [place] [facility]\"" EMERGENCY DEPT Emergency Medicine  If symptoms worsen [address] [facility] 
[phone] Return to ED if worse Diagnosis Clinical Impression: 1. Acute pain of right shoulder 2. Acute right ankle pain 3. Primary osteoarthritis involving multiple joints

## 2019-04-16 NOTE — PROGRESS NOTES
Send normal/stable results letter. Your results are normal/stable. If not signed up, consider getting my chart to get your results on-line. We can help you to sign up.  
X-ray shows some arthritis

## 2019-06-07 ENCOUNTER — APPOINTMENT (OUTPATIENT)
Dept: VASCULAR SURGERY | Age: 54
End: 2019-06-07
Attending: PHYSICIAN ASSISTANT
Payer: COMMERCIAL

## 2019-06-07 ENCOUNTER — HOSPITAL ENCOUNTER (EMERGENCY)
Age: 54
Discharge: HOME | End: 2019-06-07
Attending: EMERGENCY MEDICINE
Payer: COMMERCIAL

## 2019-06-07 VITALS
SYSTOLIC BLOOD PRESSURE: 157 MMHG | HEART RATE: 68 BPM | OXYGEN SATURATION: 97 % | RESPIRATION RATE: 18 BRPM | TEMPERATURE: 97.7 F | DIASTOLIC BLOOD PRESSURE: 83 MMHG

## 2019-06-07 DIAGNOSIS — M71.21 SYNOVIAL CYST OF RIGHT POPLITEAL SPACE: ICD-10-CM

## 2019-06-07 DIAGNOSIS — M79.661 RIGHT CALF PAIN: Primary | ICD-10-CM

## 2019-06-07 PROCEDURE — 99282 EMERGENCY DEPT VISIT SF MDM: CPT

## 2019-06-07 PROCEDURE — 93971 EXTREMITY STUDY: CPT

## 2019-06-07 NOTE — ED TRIAGE NOTES
Patient presents from home with complaints of R leg pain and swelling. Patient states he has been having this problem for about 2-3 months. Patient states the pain gets worse the longer he stands up and as the day goes on. Patient states that when he is able to rest the pain and swelling is better.  Patient denies injury or trauma

## 2019-06-07 NOTE — ED PROVIDER NOTES
49year old male presenting to the ED for RIGHT ankle pain. Pt notes that for the last 4 months he has had intermittent pain and swelling in the RIGHT lower leg, exacerbated by standing on it for long days at work. Pt notes that pain radiates into the calf, aching, gets severe at times. No injury. No fever. Primary care said symptoms might be gout 2-3 weeks ago. No medications prescribed. Tried Tylenol with no relief. No VTE risk factors.     PMHx: denies  Social: non-smoker           Past Medical History:   Diagnosis Date    Depression     Headache     Rotator cuff dysfunction 2013       Past Surgical History:   Procedure Laterality Date    HX HERNIA REPAIR  05/2018         Family History:   Problem Relation Age of Onset    Diabetes Mother     Heart Disease Father        Social History     Socioeconomic History    Marital status:      Spouse name: Not on file    Number of children: 3    Years of education: Not on file    Highest education level: Not on file   Occupational History    Occupation: break shoes   Social Needs    Financial resource strain: Not on file    Food insecurity:     Worry: Not on file     Inability: Not on file    Transportation needs:     Medical: Not on file     Non-medical: Not on file   Tobacco Use    Smoking status: Never Smoker    Smokeless tobacco: Never Used   Substance and Sexual Activity    Alcohol use: No     Alcohol/week: 0.0 oz     Comment: Occasional    Drug use: No    Sexual activity: Yes     Partners: Female   Lifestyle    Physical activity:     Days per week: Not on file     Minutes per session: Not on file    Stress: Not on file   Relationships    Social connections:     Talks on phone: Not on file     Gets together: Not on file     Attends Zoroastrianism service: Not on file     Active member of club or organization: Not on file     Attends meetings of clubs or organizations: Not on file     Relationship status: Not on file    Intimate partner violence:     Fear of current or ex partner: Not on file     Emotionally abused: Not on file     Physically abused: Not on file     Forced sexual activity: Not on file   Other Topics Concern    Not on file   Social History Narrative    Living with wife and 3 kids         ALLERGIES: Tramadol; Ibuprofen; Keflex [cephalexin]; Morphine; and Pcn [penicillins]    Review of Systems   Constitutional: Negative for fever. HENT: Negative for facial swelling. Respiratory: Negative for shortness of breath. Cardiovascular: Negative for chest pain. Gastrointestinal: Negative for vomiting. Musculoskeletal: Positive for myalgias. Skin: Negative for wound. Neurological: Negative for syncope. All other systems reviewed and are negative. Vitals:    06/07/19 0902   BP: 157/83   Pulse: 68   Resp: 18   Temp: 97.7 °F (36.5 °C)   SpO2: 97%            Physical Exam   Constitutional: He is oriented to person, place, and time. He appears well-developed and well-nourished. Pleasant AA male   HENT:   Head: Normocephalic. Eyes: Conjunctivae are normal.   Neck: Neck supple. Cardiovascular: Normal rate. Pulmonary/Chest: Effort normal. No respiratory distress. Abdominal: He exhibits no distension. Musculoskeletal: Normal range of motion. RIGHT leg: no change compared to left, no edema, erythema, warmth.  + calf TTP. DP and PT pulses intact. Foot warm and well-perfused, brisk cap refill. LUPILLO at bedside 1.9. Neurological: He is alert and oriented to person, place, and time. Skin: Skin is warm and dry. Psychiatric: He has a normal mood and affect. MDM  Number of Diagnoses or Management Options  Diagnosis management comments: 49year old male presenting for 4 months of RIGHT proximal calf pain, worsened with activity. + TTP, no edema noted, distal pulses intact, foot warm and well-perfused, LUPILLO at bedside not indicative of acute arterial occlusion.   Duplex negative for DVT, + popliteal fluid collection, likely Baker's cyst.  Encouraged pt to f/u with ortho and also PCP given elevated LUPILLO, ? calcified vessel.        Amount and/or Complexity of Data Reviewed  Tests in the radiology section of CPT®: ordered and reviewed  Discuss the patient with other providers: yes (Dr. Mich Antunez, ED attending)           Procedures

## 2019-06-07 NOTE — DISCHARGE INSTRUCTIONS
Patient Education     - return for new or worsening symptoms  - follow up with ortho (Dr. Jennifer Orantes) and primary care  - ibuprofen or tylenol as needed     Baker's Cyst: Care Instructions  Your Care Instructions    A Baker's cyst is a swelling behind the knee. It may cause pain or stiffness when you bend your knee or straighten it all the way. Baker's cysts are also called popliteal cysts. If you have arthritis or another condition that is the cause of the Baker's cyst, your doctor may treat that condition. A Baker's cyst may go away on its own. If not, or if it is causing a lot of discomfort, your doctor may drain the fluid that has built up behind the knee. In some cases, a Baker's cyst is removed in surgery. There are things you can do at home, such as staying off your leg, to reduce the swelling and pain. Follow-up care is a key part of your treatment and safety. Be sure to make and go to all appointments, and call your doctor if you are having problems. It's also a good idea to know your test results and keep a list of the medicines you take. How can you care for yourself at home? · Rest your knee as much as possible. · Ask your doctor if you can take an over-the-counter pain medicine, such as acetaminophen (Tylenol), ibuprofen (Advil, Motrin), or naproxen (Aleve). Be safe with medicines. Read and follow all instructions on the label. · Use a cane, a crutch, a walker, or another device if you need help to get around. These can help rest your knees. · If you have an elastic bandage, make sure it is snug but not so tight that your leg is numb, tingles, or swells below the bandage. Ask your doctor if you can loosen the bandage if it is too tight. · Follow your doctor's instructions about how much weight you can put on your knee. · Stay at a healthy weight. Being overweight puts extra strain on your knee. When should you call for help? Call 911 anytime you think you may need emergency care.  For example, call if:    · You have chest pain, are short of breath, or you cough up blood.    Call your doctor now or seek immediate medical care if:    · You have new or worse pain.     · Your foot is cool or pale or changes color.     · You have tingling, weakness, or numbness in your foot or toes.     · You have signs of a blood clot in your leg (called a deep vein thrombosis), such as:  ? Pain in your calf, back of the knee, thigh, or groin. ? Redness or swelling in your leg.    Watch closely for changes in your health, and be sure to contact your doctor if:    · You do not get better as expected. Where can you learn more? Go to http://jesse-trena.info/. Enter N103 in the search box to learn more about \"Baker's Cyst: Care Instructions. \"  Current as of: September 20, 2018  Content Version: 11.9  © 2945-9651 [employer]. Care instructions adapted under license by [employer] (which disclaims liability or warranty for this information). If you have questions about a medical condition or this instruction, always ask your healthcare professional. [place] [address] any warranty or liability for your use of this information.

## 2019-07-09 ENCOUNTER — OFFICE VISIT (OUTPATIENT)
Dept: INTERNAL MEDICINE CLINIC | Age: 54
End: 2019-07-09

## 2019-07-09 VITALS
DIASTOLIC BLOOD PRESSURE: 84 MMHG | OXYGEN SATURATION: 98 % | BODY MASS INDEX: 30.62 KG/M2 | TEMPERATURE: 97.3 F | HEART RATE: 83 BPM | WEIGHT: 231 LBS | RESPIRATION RATE: 20 BRPM | SYSTOLIC BLOOD PRESSURE: 126 MMHG | HEIGHT: 73 IN

## 2019-07-09 DIAGNOSIS — M25.571 RIGHT ANKLE PAIN, UNSPECIFIED CHRONICITY: Primary | ICD-10-CM

## 2019-07-09 RX ORDER — HYDROCODONE BITARTRATE AND ACETAMINOPHEN 5; 325 MG/1; MG/1
1 TABLET ORAL
Qty: 20 TAB | Refills: 0 | Status: SHIPPED | OUTPATIENT
Start: 2019-07-09 | End: 2019-07-16

## 2019-07-09 NOTE — PROGRESS NOTES
C/o ankle pain inhibits his walking - had an ankle Xray in April 2019 and venous doppler 3 weeks ago at [facility] from ER - denies injury - X a few weeks  Andrea Esqueda LPN  0/0/2043  3:62 PM

## 2019-07-09 NOTE — PROGRESS NOTES
HISTORY OF PRESENT ILLNESS  Lisa Tyler is a 49 y.o. male. Ankle Pain    The history is provided by the patient and significant other. This is a recurrent problem. Episode onset: months. The problem occurs daily. The problem has not changed since onset. The pain is present in the right ankle. The quality of the pain is described as aching. The pain is severe. Pertinent negatives include no back pain. Treatments tried: colcrys steroids. The treatment provided no relief. There has been no history of extremity trauma. Allergies   Allergen Reactions    Tramadol Anaphylaxis    Ibuprofen Nausea Only    Keflex [Cephalexin] Itching    Morphine Hives    Pcn [Penicillins] Angioedema       Review of Systems   Constitutional: Negative for fever. Musculoskeletal: Positive for joint pain. Negative for back pain and falls. Other ankle OK       Physical Exam  Visit Vitals  /84 (BP 1 Location: Left arm, BP Patient Position: At rest)   Pulse 83   Temp 97.3 °F (36.3 °C) (Oral)   Resp 20   Ht 6' 1\" (1.854 m)   Wt 231 lb (104.8 kg)   SpO2 98%   BMI 30.48 kg/m²     WD WN male NAD  Heart RRR without murmers clicks or rubs  Lungs CTA  Abdo soft nontender  Ext no edema    ASSESSMENT and PLAN  Encounter Diagnoses   Name Primary?  Right ankle pain, unspecified chronicity Yes     Orders Placed This Encounter    REFERRAL TO ORTHOPEDICS    HYDROcodone-acetaminophen (NORCO) 5-325 mg per tablet     Patient was looked up in the . There are multiple prescribers of controlled substances  yes. We discussed this pain and the usage of controlled substances for ankle pain relief. In general these medications are indicated for the acute symptom relief and not for chronic usage, allthough they are frequently used for chronic management  After a certain period of time they should be stopped to avoid dependence and/or addiction.   I warned him about the dangers of addiction and other side affects including respiratory depression and death. Discussed how this is a controlled substance and that the prescribing of it is should be monitored very carefully. Drug usage is also monitored by our practise and the [facility], since there has been a lot of abuse and diversion of controlled substances. In general we do not refill this medication over the phone. PLease ask for enough pills to get you to your next appointment and make sure you keep your appointments. Warned not to take other medications like alcohol, other benzodiazapines marijuana or other narcotics when on this medication. Follow-up and Dispositions    · Return if symptoms worsen or fail to improve.

## 2019-07-23 ENCOUNTER — DOCUMENTATION ONLY (OUTPATIENT)
Dept: INTERNAL MEDICINE CLINIC | Age: 54
End: 2019-07-23

## 2019-12-29 ENCOUNTER — APPOINTMENT (OUTPATIENT)
Dept: GENERAL RADIOLOGY | Age: 54
End: 2019-12-29
Attending: EMERGENCY MEDICINE
Payer: COMMERCIAL

## 2019-12-29 ENCOUNTER — HOSPITAL ENCOUNTER (EMERGENCY)
Age: 54
Discharge: HOME | End: 2019-12-29
Attending: EMERGENCY MEDICINE
Payer: COMMERCIAL

## 2019-12-29 VITALS
BODY MASS INDEX: 30.45 KG/M2 | WEIGHT: 229.72 LBS | TEMPERATURE: 100.8 F | SYSTOLIC BLOOD PRESSURE: 124 MMHG | DIASTOLIC BLOOD PRESSURE: 72 MMHG | HEIGHT: 73 IN | RESPIRATION RATE: 20 BRPM | HEART RATE: 72 BPM | OXYGEN SATURATION: 98 %

## 2019-12-29 DIAGNOSIS — J11.1 INFLUENZA-LIKE ILLNESS: ICD-10-CM

## 2019-12-29 DIAGNOSIS — J20.9 ACUTE BRONCHITIS, UNSPECIFIED ORGANISM: Primary | ICD-10-CM

## 2019-12-29 LAB
ALBUMIN SERPL-MCNC: 3.6 G/DL (ref 3.5–5)
ALBUMIN/GLOB SERPL: 1 {RATIO} (ref 1.1–2.2)
ALP SERPL-CCNC: 63 U/L (ref 45–117)
ALT SERPL-CCNC: 26 U/L (ref 12–78)
ANION GAP SERPL CALC-SCNC: 5 MMOL/L (ref 5–15)
AST SERPL-CCNC: 22 U/L (ref 15–37)
BASOPHILS # BLD: 0 K/UL (ref 0–0.1)
BASOPHILS NFR BLD: 0 % (ref 0–1)
BILIRUB SERPL-MCNC: 0.5 MG/DL (ref 0.2–1)
BUN SERPL-MCNC: 13 MG/DL (ref 6–20)
BUN/CREAT SERPL: 9 (ref 12–20)
CALCIUM SERPL-MCNC: 8.7 MG/DL (ref 8.5–10.1)
CHLORIDE SERPL-SCNC: 108 MMOL/L (ref 97–108)
CK SERPL-CCNC: 147 U/L (ref 39–308)
CO2 SERPL-SCNC: 25 MMOL/L (ref 21–32)
COMMENT, HOLDF: NORMAL
CREAT SERPL-MCNC: 1.4 MG/DL (ref 0.7–1.3)
DIFFERENTIAL METHOD BLD: ABNORMAL
EOSINOPHIL # BLD: 0 K/UL (ref 0–0.4)
EOSINOPHIL NFR BLD: 0 % (ref 0–7)
ERYTHROCYTE [DISTWIDTH] IN BLOOD BY AUTOMATED COUNT: 13.2 % (ref 11.5–14.5)
FLUAV AG NPH QL IA: NEGATIVE
FLUBV AG NOSE QL IA: NEGATIVE
GLOBULIN SER CALC-MCNC: 3.7 G/DL (ref 2–4)
GLUCOSE BLD STRIP.AUTO-MCNC: 94 MG/DL (ref 65–100)
GLUCOSE SERPL-MCNC: 100 MG/DL (ref 65–100)
HCT VFR BLD AUTO: 43.2 % (ref 36.6–50.3)
HGB BLD-MCNC: 14.2 G/DL (ref 12.1–17)
IMM GRANULOCYTES # BLD AUTO: 0 K/UL (ref 0–0.04)
IMM GRANULOCYTES NFR BLD AUTO: 0 % (ref 0–0.5)
LYMPHOCYTES # BLD: 1.4 K/UL (ref 0.8–3.5)
LYMPHOCYTES NFR BLD: 29 % (ref 12–49)
MCH RBC QN AUTO: 27.6 PG (ref 26–34)
MCHC RBC AUTO-ENTMCNC: 32.9 G/DL (ref 30–36.5)
MCV RBC AUTO: 83.9 FL (ref 80–99)
MONOCYTES # BLD: 0.9 K/UL (ref 0–1)
MONOCYTES NFR BLD: 19 % (ref 5–13)
NEUTS SEG # BLD: 2.5 K/UL (ref 1.8–8)
NEUTS SEG NFR BLD: 52 % (ref 32–75)
NRBC # BLD: 0 K/UL (ref 0–0.01)
NRBC BLD-RTO: 0 PER 100 WBC
PLATELET # BLD AUTO: 213 K/UL (ref 150–400)
PMV BLD AUTO: 10.3 FL (ref 8.9–12.9)
POTASSIUM SERPL-SCNC: 3.6 MMOL/L (ref 3.5–5.1)
PROT SERPL-MCNC: 7.3 G/DL (ref 6.4–8.2)
RBC # BLD AUTO: 5.15 M/UL (ref 4.1–5.7)
SAMPLES BEING HELD,HOLD: NORMAL
SERVICE CMNT-IMP: NORMAL
SODIUM SERPL-SCNC: 138 MMOL/L (ref 136–145)
TROPONIN I SERPL-MCNC: <0.05 NG/ML
WBC # BLD AUTO: 4.9 K/UL (ref 4.1–11.1)

## 2019-12-29 PROCEDURE — 85025 COMPLETE CBC W/AUTO DIFF WBC: CPT

## 2019-12-29 PROCEDURE — 82962 GLUCOSE BLOOD TEST: CPT

## 2019-12-29 PROCEDURE — 96374 THER/PROPH/DIAG INJ IV PUSH: CPT

## 2019-12-29 PROCEDURE — 99285 EMERGENCY DEPT VISIT HI MDM: CPT

## 2019-12-29 PROCEDURE — 74011250636 HC RX REV CODE- 250/636: Performed by: EMERGENCY MEDICINE

## 2019-12-29 PROCEDURE — 82550 ASSAY OF CK (CPK): CPT

## 2019-12-29 PROCEDURE — 71045 X-RAY EXAM CHEST 1 VIEW: CPT

## 2019-12-29 PROCEDURE — 36415 COLL VENOUS BLD VENIPUNCTURE: CPT

## 2019-12-29 PROCEDURE — 84484 ASSAY OF TROPONIN QUANT: CPT

## 2019-12-29 PROCEDURE — 80053 COMPREHEN METABOLIC PANEL: CPT

## 2019-12-29 PROCEDURE — 87804 INFLUENZA ASSAY W/OPTIC: CPT

## 2019-12-29 PROCEDURE — 93005 ELECTROCARDIOGRAM TRACING: CPT

## 2019-12-29 PROCEDURE — 74011250637 HC RX REV CODE- 250/637: Performed by: EMERGENCY MEDICINE

## 2019-12-29 RX ORDER — PREDNISONE 10 MG/1
TABLET ORAL
Qty: 21 TAB | Refills: 0 | Status: SHIPPED | OUTPATIENT
Start: 2019-12-29 | End: 2020-02-18 | Stop reason: DRUGHIGH

## 2019-12-29 RX ORDER — ACETAMINOPHEN 500 MG
1000 TABLET ORAL ONCE
Status: COMPLETED | OUTPATIENT
Start: 2019-12-29 | End: 2019-12-29

## 2019-12-29 RX ORDER — BENZONATATE 200 MG/1
200 CAPSULE ORAL
Qty: 21 CAP | Refills: 0 | Status: SHIPPED | OUTPATIENT
Start: 2019-12-29 | End: 2020-01-05

## 2019-12-29 RX ORDER — KETOROLAC TROMETHAMINE 30 MG/ML
15 INJECTION, SOLUTION INTRAMUSCULAR; INTRAVENOUS
Status: COMPLETED | OUTPATIENT
Start: 2019-12-29 | End: 2019-12-29

## 2019-12-29 RX ADMIN — ACETAMINOPHEN 1000 MG: 500 TABLET ORAL at 12:41

## 2019-12-29 RX ADMIN — SODIUM CHLORIDE 1000 ML: 900 INJECTION, SOLUTION INTRAVENOUS at 12:41

## 2019-12-29 RX ADMIN — KETOROLAC TROMETHAMINE 15 MG: 30 INJECTION, SOLUTION INTRAMUSCULAR at 15:50

## 2019-12-29 NOTE — ED PROVIDER NOTES
EMERGENCY DEPARTMENT HISTORY AND PHYSICAL EXAM      Date: 12/29/2019  Patient Name: Bela Mera    History of Presenting Illness     Chief Complaint   Patient presents with    Cough     x 1 week. CP now x 2 days. Pt reports sweating constantly and lethergy. Pt denies N/V.    Generalized Body Aches       History Provided By: Patient    HPI: Bela Mera, 49 y.o. male  presents to the ED with cc of cough and generalized body aches. Patient states he has had cough for about 1 week. Nonproductive. He has had chest pain with cough for the past 2 days. Again this chest pain is only when he coughs and not at any other time. Patient denies any shortness of breath. He has had generalized body aches for about a week. He also states he has been sweating constantly. He denies any fevers at home. He states he has been feeling fatigued. He denies any nausea vomiting or diarrhea. No headaches. He states he has had decreased appetite and has not been eating well or drinking much the past week. There are no other complaints, changes, or physical findings at this time. PCP: Néstor Ramirez MD    No current facility-administered medications on file prior to encounter. No current outpatient medications on file prior to encounter. Past History     Past Medical History:  Past Medical History:   Diagnosis Date    Depression     Headache     Rotator cuff dysfunction 2013       Past Surgical History:  Past Surgical History:   Procedure Laterality Date    HX HERNIA REPAIR  05/2018       Family History:  Family History   Problem Relation Age of Onset    Diabetes Mother     Heart Disease Father        Social History:  Social History     Tobacco Use    Smoking status: Never Smoker    Smokeless tobacco: Never Used   Substance Use Topics    Alcohol use: No     Alcohol/week: 0.0 standard drinks     Comment: Occasional    Drug use: No       Allergies:   Allergies   Allergen Reactions    Tramadol Anaphylaxis    Ibuprofen Nausea Only    Keflex [Cephalexin] Itching    Morphine Hives    Pcn [Penicillins] Angioedema         Review of Systems   Review of Systems   Constitutional: Positive for appetite change, diaphoresis and fatigue. Negative for chills and fever. HENT: Negative for congestion, ear pain, rhinorrhea, sore throat and trouble swallowing. Eyes: Negative for visual disturbance. Respiratory: Positive for cough. Negative for chest tightness and shortness of breath. Cardiovascular: Positive for chest pain. Negative for palpitations. Gastrointestinal: Negative for abdominal pain, blood in stool, constipation, diarrhea, nausea and vomiting. Genitourinary: Negative for decreased urine volume, difficulty urinating, dysuria and frequency. Musculoskeletal: Positive for myalgias. Negative for back pain and neck pain. Skin: Negative for color change and rash. Neurological: Negative for dizziness, weakness, light-headedness and headaches. Physical Exam   Physical Exam  Vitals signs and nursing note reviewed. Constitutional:       General: He is not in acute distress. Appearance: He is well-developed. He is not ill-appearing. Eyes:      Conjunctiva/sclera: Conjunctivae normal.   Neck:      Musculoskeletal: Neck supple. Cardiovascular:      Rate and Rhythm: Normal rate and regular rhythm. Pulmonary:      Effort: Pulmonary effort is normal. No accessory muscle usage or respiratory distress. Breath sounds: Normal breath sounds. Abdominal:      General: There is no distension. Palpations: Abdomen is soft. Tenderness: There is no tenderness. Lymphadenopathy:      Cervical: No cervical adenopathy. Skin:     General: Skin is warm and dry. Neurological:      Mental Status: He is alert and oriented to person, place, and time. Cranial Nerves: No cranial nerve deficit. Sensory: No sensory deficit.          Diagnostic Study Results     Labs -     Recent Results (from the past 24 hour(s))   EKG, 12 LEAD, INITIAL    Collection Time: 12/29/19 12:13 PM   Result Value Ref Range    Ventricular Rate 82 BPM    Atrial Rate 82 BPM    P-R Interval 142 ms    QRS Duration 84 ms    Q-T Interval 366 ms    QTC Calculation (Bezet) 427 ms    Calculated P Axis 22 degrees    Calculated R Axis 13 degrees    Calculated T Axis 11 degrees    Diagnosis       Normal sinus rhythm  Possible Left atrial enlargement  When compared with ECG of 03-SEP-2018 11:35,  No significant change was found     GLUCOSE, POC    Collection Time: 12/29/19 12:17 PM   Result Value Ref Range    Glucose (POC) 94 65 - 100 mg/dL    Performed by [facility]    CBC WITH AUTOMATED DIFF    Collection Time: 12/29/19 12:28 PM   Result Value Ref Range    WBC 4.9 4.1 - 11.1 K/uL    RBC 5.15 4.10 - 5.70 M/uL    HGB 14.2 12.1 - 17.0 g/dL    HCT 43.2 36.6 - 50.3 %    MCV 83.9 80.0 - 99.0 FL    MCH 27.6 26.0 - 34.0 PG    MCHC 32.9 30.0 - 36.5 g/dL    RDW 13.2 11.5 - 14.5 %    PLATELET 557 004 - 668 K/uL    MPV 10.3 8.9 - 12.9 FL    NRBC 0.0 0  WBC    ABSOLUTE NRBC 0.00 0.00 - 0.01 K/uL    NEUTROPHILS 52 32 - 75 %    LYMPHOCYTES 29 12 - 49 %    MONOCYTES 19 (H) 5 - 13 %    EOSINOPHILS 0 0 - 7 %    BASOPHILS 0 0 - 1 %    IMMATURE GRANULOCYTES 0 0.0 - 0.5 %    ABS. NEUTROPHILS 2.5 1.8 - 8.0 K/UL    ABS. LYMPHOCYTES 1.4 0.8 - 3.5 K/UL    ABS. MONOCYTES 0.9 0.0 - 1.0 K/UL    ABS. EOSINOPHILS 0.0 0.0 - 0.4 K/UL    ABS. BASOPHILS 0.0 0.0 - 0.1 K/UL    ABS. IMM.  GRANS. 0.0 0.00 - 0.04 K/UL    DF AUTOMATED     METABOLIC PANEL, COMPREHENSIVE    Collection Time: 12/29/19 12:28 PM   Result Value Ref Range    Sodium 138 136 - 145 mmol/L    Potassium 3.6 3.5 - 5.1 mmol/L    Chloride 108 97 - 108 mmol/L    CO2 25 21 - 32 mmol/L    Anion gap 5 5 - 15 mmol/L    Glucose 100 65 - 100 mg/dL    BUN 13 6 - 20 MG/DL    Creatinine 1.40 (H) 0.70 - 1.30 MG/DL    BUN/Creatinine ratio 9 (L) 12 - 20      GFR est AA >60 >60 ml/min/1.73m2    GFR est non-AA 53 (L) >60 ml/min/1.73m2    Calcium 8.7 8.5 - 10.1 MG/DL    Bilirubin, total 0.5 0.2 - 1.0 MG/DL    ALT (SGPT) 26 12 - 78 U/L    AST (SGOT) 22 15 - 37 U/L    Alk. phosphatase 63 45 - 117 U/L    Protein, total 7.3 6.4 - 8.2 g/dL    Albumin 3.6 3.5 - 5.0 g/dL    Globulin 3.7 2.0 - 4.0 g/dL    A-G Ratio 1.0 (L) 1.1 - 2.2     CK W/ REFLX CKMB    Collection Time: 12/29/19 12:28 PM   Result Value Ref Range     39 - 308 U/L   TROPONIN I    Collection Time: 12/29/19 12:28 PM   Result Value Ref Range    Troponin-I, Qt. <0.05 <0.05 ng/mL   SAMPLES BEING HELD    Collection Time: 12/29/19 12:28 PM   Result Value Ref Range    SAMPLES BEING HELD  1 MIGUELITO, 1 RED     COMMENT        Add-on orders for these samples will be processed based on acceptable specimen integrity and analyte stability, which may vary by analyte. INFLUENZA A & B AG (RAPID TEST)    Collection Time: 12/29/19 12:31 PM   Result Value Ref Range    Influenza A Antigen NEGATIVE  NEG      Influenza B Antigen NEGATIVE  NEG         Radiologic Studies -   XR CHEST PORT   Final Result   Impression:   1. No acute disease           CT Results  (Last 48 hours)    None        CXR Results  (Last 48 hours)               12/29/19 1251  XR CHEST PORT Final result    Impression:  Impression:   1. No acute disease           Narrative:  INDICATION:  chest pain        Exam: Portable chest 1208. Comparison: 9/3/2018. Findings: Cardiomediastinal silhouette is within normal limits. Pulmonary   vasculature is not engorged. There are no focal parenchymal opacities,   effusions, or pneumothorax. Medical Decision Making   I am the first provider for this patient. I reviewed the vital signs, available nursing notes, past medical history, past surgical history, family history and social history. Vital Signs-Reviewed the patient's vital signs.   Patient Vitals for the past 24 hrs:   Temp Pulse Resp BP SpO2   12/29/19 1430  72 20 124/72 98 % 12/29/19 1415  68 20 124/83 99 %   12/29/19 1400  66 23 125/76 97 %   12/29/19 1345  64 18 127/85 99 %   12/29/19 1330  68 20 144/78 100 %   12/29/19 1315  71 27 129/75 100 %   12/29/19 1300  67 13 119/81 100 %   12/29/19 1201 (!) 100.8 °F (38.2 °C) 87 18 119/89 98 %         Records Reviewed: Nursing Notes and Old Medical Records    Provider Notes (Medical Decision Making):   Patient presents with cough for a week. He has been having myalgias fevers and pain in his chest when he coughs. Chest x-ray does not show any signs of pneumonia. His influenza test was negative but may be a false negative or he has other type of viral syndrome. Labs are all unremarkable. We will place him on steroid taper for bronchitis since he has a bad cough. We will also provide him with a cough medicine. Chances are he is at the end of his course of this virus. He will likely improve in the next couple days. Recommend hydration at home and rest.    ED Course:   Initial assessment performed. The patients presenting problems have been discussed, and they are in agreement with the care plan formulated and outlined with them. I have encouraged them to ask questions as they arise throughout their visit.          Orders Placed This Encounter    INFLUENZA A & B AG (RAPID TEST)    XR CHEST PORT    CBC WITH AUTOMATED DIFF    METABOLIC PANEL, COMPREHENSIVE    CK W/ REFLX CKMB    TROPONIN I (Utilize POC if available)    SAMPLES BEING HELD    CHEST PAIN PANEL TRACKING (DO NOT DESELECT)    CARDIAC MONITOR - ED ONLY    OXYGEN CANNULA    VITAL SIGNS Per Protocol    MEASURE HEIGHT    WEIGH PATIENT    OXYGEN CANNULA Liters per minute: 2; Indications for O2 therapy: CHEST PAIN CONTINUOUS STAT    GLUCOSE, POC    EKG, 12 LEAD, INITIAL    SALINE LOCK IV ONE TIME STAT    sodium chloride 0.9 % bolus infusion 1,000 mL    acetaminophen (TYLENOL) tablet 1,000 mg    ketorolac (TORADOL) injection 15 mg    predniSONE (STERAPRED DS) 10 mg dose pack    benzonatate (TESSALON) 200 mg capsule       Procedures      Critical Care Time:   0    Disposition:  Discharge  3:49 PM    The patient's emergency department evaluation is now complete. I have reviewed all labs, imaging, and pertinent information. I have discussed all results with the patient and/or family. Based on our evaluation today I do believe that the patient is safe to be discharged home. The patient has been provided with at home instructions that are pertinent to their complaint today, although these may not be specific to the exact diagnosis. I have reviewed the patient's home medications and attempted to reconcile if not already done so by pharmacy or nursing staff. I have discussed all new prescriptions with the patient. The patient has been encouraged to follow-up with primary care doctor and/or specialist, and these have been discussed with the patient. The patient has been advised that they may return to the emergency department if they have any worsening symptoms and or new symptoms that are of concern to them. Verbal discharge instructions may have also been provided to the patient that may not be specifically contained in the written discharge instructions. The patient has been given opportunity to ask questions prior to discharge. PLAN:  1. Current Discharge Medication List      START taking these medications    Details   predniSONE (STERAPRED DS) 10 mg dose pack 6-day Dosepak use as directed  Qty: 21 Tab, Refills: 0      benzonatate (TESSALON) 200 mg capsule Take 1 Cap by mouth three (3) times daily as needed for Cough for up to 7 days. Qty: 21 Cap, Refills: 0           2.    Follow-up Information     Follow up With Specialties Details Why Contact Info    Mica Faust MD Family Practice Schedule an appointment as soon as possible for a visit in 2 days  [address] [place] [address] [phone]          Return to ED if worse Diagnosis     Clinical Impression:   1. Acute bronchitis, unspecified organism    2. Influenza-like illness            This note will not be viewable in MyChart.

## 2019-12-29 NOTE — ED NOTES
Pt states weakness x 1 week and cough with chest pain this morning. Feverish on arrival. Pt stuttered on exam with presence of facial twitching. Neuro exam intact.

## 2019-12-29 NOTE — LETTER
[address] 
[facility] EMERGENCY DEPT 
[address] [facility] [place] [id number] 
[phone] Work/School Note Date: 12/29/2019 To Whom It May concern: 
 
Petra Phillips was seen and treated today in the emergency room by the following provider(s): 
Attending Provider: Arnaldo De La Garza MD. Petra Phillips may return to work on 1/1/20.  
 
Sincerely, 
 
 
 
 
Alba Petty RN

## 2019-12-30 LAB
ATRIAL RATE: 82 BPM
CALCULATED P AXIS, ECG09: 22 DEGREES
CALCULATED R AXIS, ECG10: 13 DEGREES
CALCULATED T AXIS, ECG11: 11 DEGREES
DIAGNOSIS, 93000: NORMAL
P-R INTERVAL, ECG05: 142 MS
Q-T INTERVAL, ECG07: 366 MS
QRS DURATION, ECG06: 84 MS
QTC CALCULATION (BEZET), ECG08: 427 MS
VENTRICULAR RATE, ECG03: 82 BPM

## 2020-02-18 ENCOUNTER — OFFICE VISIT (OUTPATIENT)
Dept: INTERNAL MEDICINE CLINIC | Age: 55
End: 2020-02-18

## 2020-02-18 VITALS
TEMPERATURE: 98.1 F | DIASTOLIC BLOOD PRESSURE: 60 MMHG | HEART RATE: 81 BPM | SYSTOLIC BLOOD PRESSURE: 135 MMHG | OXYGEN SATURATION: 99 % | HEIGHT: 73 IN | WEIGHT: 225 LBS | RESPIRATION RATE: 16 BRPM | BODY MASS INDEX: 29.82 KG/M2

## 2020-02-18 DIAGNOSIS — J01.00 ACUTE MAXILLARY SINUSITIS, RECURRENCE NOT SPECIFIED: ICD-10-CM

## 2020-02-18 DIAGNOSIS — M25.571 RIGHT ANKLE PAIN, UNSPECIFIED CHRONICITY: Primary | ICD-10-CM

## 2020-02-18 RX ORDER — HYDROCODONE BITARTRATE AND ACETAMINOPHEN 5; 325 MG/1; MG/1
1 TABLET ORAL
Qty: 21 TAB | Refills: 0 | Status: SHIPPED | OUTPATIENT
Start: 2020-02-18 | End: 2020-02-25

## 2020-02-18 RX ORDER — PREDNISONE 20 MG/1
40 TABLET ORAL
Qty: 10 TAB | Refills: 0 | Status: SHIPPED | OUTPATIENT
Start: 2020-02-18 | End: 2020-02-23

## 2020-02-18 NOTE — LETTER
NOTIFICATION OF RETURN TO WORK  
 
2/18/2020 4:07 PM 
 
Mr. Abelardo Duran [address] [place] [id number] [place] To Whom It May Concern: 
 
Abelardo Duran was under the care of [address]. He will be able to return to work on April 1, 2020. If there are questions or concerns please have the patient contact our office. Sincerely, Roge Clancy MD

## 2020-02-18 NOTE — PROGRESS NOTES
HISTORY OF PRESENT ILLNESS  Kermit Edmond is a 49 y.o. male. Ankle Pain    The history is provided by the patient. This is a recurrent problem. Episode onset: 1-2 mo. The problem occurs constantly. The problem has been gradually worsening. The pain is present in the right ankle and right foot (occa foot). The quality of the pain is described as sharp. The pain is severe. Pertinent negatives include full range of motion and no back pain. He has tried OTC pain medications for the symptoms. The treatment provided no relief. There has been no history of extremity trauma. Cold Symptoms     Pain is severe enough that he has had to miss work, her supervisor sent him home. Had this problem for bit more severe this time. Bitterly complains about the pain. Worse after he has been on it a while. Walks concrete floors. Last visit for this was in July, managed to go back to work. Complains of sinus congestion and a cough nonproductive for 4 days. No fever rash ear or throat pain. Does have a bit of a headache. Review of Systems   Constitutional: Negative for fever. Respiratory: Positive for cough. Musculoskeletal: Positive for joint pain. Negative for back pain and falls. No known diagnosis of gout. Patient Active Problem List   Diagnosis Code    Ventral hernia without obstruction or gangrene K43.9     Social History     Socioeconomic History    Marital status:      Spouse name: Not on file    Number of children: 3    Years of education: Not on file    Highest education level: Not on file   Occupational History    Occupation: break shoes   Social Needs    Financial resource strain: Not on file    Food insecurity:     Worry: Not on file     Inability: Not on file    Transportation needs:     Medical: Not on file     Non-medical: Not on file   Tobacco Use    Smoking status: Never Smoker    Smokeless tobacco: Never Used   Substance and Sexual Activity    Alcohol use:  No Alcohol/week: 0.0 standard drinks     Comment: Occasional    Drug use: No    Sexual activity: Yes     Partners: Female   Lifestyle    Physical activity:     Days per week: Not on file     Minutes per session: Not on file    Stress: Not on file   Relationships    Social connections:     Talks on phone: Not on file     Gets together: Not on file     Attends Protestant service: Not on file     Active member of club or organization: Not on file     Attends meetings of clubs or organizations: Not on file     Relationship status: Not on file    Intimate partner violence:     Fear of current or ex partner: Not on file     Emotionally abused: Not on file     Physically abused: Not on file     Forced sexual activity: Not on file   Other Topics Concern    Not on file   Social History Narrative    Living with wife and 3 kids         Physical Exam  Visit Vitals  /60 (BP 1 Location: Left arm, BP Patient Position: Sitting)   Pulse 81   Temp 98.1 °F (36.7 °C) (Temporal)   Resp 16   Ht 6' 1\" (1.854 m)   Wt 225 lb (102.1 kg)   SpO2 99%   BMI 29.69 kg/m²     WD WN male NAD  Heart RRR without murmers clicks or rubs  Lungs CTA  Abdo soft nontender  Ext no edema, says that the right ankle does tend to swell a great deal in the afternoon and towards the end of the day but then it goes back to normal in the morning. He    ASSESSMENT and PLAN  Encounter Diagnoses   Name Primary?  Right ankle pain, unspecified chronicity Yes    Acute maxillary sinusitis, recurrence not specified      Orders Placed This Encounter    AMB SUPPLY ORDER    METABOLIC PANEL, COMPREHENSIVE    CBC W/O DIFF    URIC ACID    REFERRAL TO ORTHOPEDICS    HYDROcodone-acetaminophen (NORCO) 5-325 mg per tablet    predniSONE (DELTASONE) 20 mg tablet     Referred him to orthopedics for further assessment, x-ray done previously showed nothing but arthritis. Labs as above to evaluate. Okay short course of narcotic.   Trial of prednisone to see if that helps the ankle or the sinuses. Get a ankle splint. Off work x 5 weeks    We discussed this pain and the usage of controlled substances for ankle pain relief. In general these medications are indicated for the acute symptom relief and not for chronic usage, allthough they are frequently used for chronic management  After a certain period of time they should be stopped to avoid dependence and/or addiction. I warned him about the dangers of addiction and other side affects including respiratory depression and death. Discussed how this is a controlled substance and that the prescribing of it is should be monitored very carefully. Drug usage is also monitored by our practise and the [facility], since there has been a lot of abuse and diversion of controlled substances. In general we do not refill this medication over the phone. PLease ask for enough pills to get you to your next appointment and make sure you keep your appointments. Warned not to take other medications like alcohol, other benzodiazapines marijuana or other narcotics when on this medication. Explained to the patient that most cough and cold symptoms are due to viruses. These do not respond to antibiotics. Most cold symptoms on average last 7-10 days, but sometimes the symptoms will last longer. Cough and cold symptoms generally get better on their own without antibiotics. Antibiotics tend to cause side effects like rashes, yeast infections and increased bacterial resistance. Follow-up and Dispositions    · Return in about 5 weeks (around 3/24/2020) for routine follow up.

## 2020-02-18 NOTE — PROGRESS NOTES
Chief Complaint   Patient presents with    Ankle Pain     swelling and pain to R/ankle, out of work 3 weeks, tried going back to work but could stand on it   Rajiv Hurrodrigo Symptoms     prod cough thick green sputum, nose running, headaches x3 days     Health Maintenance reviewed. I have reviewed the patient's medical history in detail and updated the computerized patient record. 1. Have you been to the ER, urgent care clinic since your last visit? Hospitalized since your last visit?no    2. Have you seen or consulted any other health care providers outside of the [address] [place] [address] Home since your last visit? Include any pap smears or colon screening. No      Encouraged pt to discuss pt's wishes with spouse/partner/family and bring them in the next appt to follow thru with the Advanced Directive    Fall Risk Assessment, last 12 mths 2/18/2020   Able to walk? Yes   Fall in past 12 months? No       3 most recent PHQ Screens 2/18/2020   Little interest or pleasure in doing things More than half the days   Feeling down, depressed, irritable, or hopeless More than half the days   Total Score PHQ 2 4       Abuse Screening Questionnaire 2/18/2020   Do you ever feel afraid of your partner? N   Are you in a relationship with someone who physically or mentally threatens you? N   Is it safe for you to go home?  Y       ADL Assessment 2/18/2020   Feeding yourself No Help Needed   Getting from bed to chair No Help Needed   Getting dressed No Help Needed   Bathing or showering No Help Needed   Walk across the room (includes cane/walker) No Help Needed   Using the telphone No Help Needed   Taking your medications No Help Needed   Preparing meals No Help Needed   Managing money (expenses/bills) No Help Needed   Moderately strenuous housework (laundry) No Help Needed   Shopping for personal items (toiletries/medicines) No Help Needed   Shopping for groceries No Help Needed   Driving Help Needed   Climbing a flight of stairs No Help Needed   Getting to places beyond walking distances Help Needed

## 2020-03-04 ENCOUNTER — TELEPHONE (OUTPATIENT)
Dept: INTERNAL MEDICINE CLINIC | Age: 55
End: 2020-03-04

## 2020-03-04 NOTE — TELEPHONE ENCOUNTER
----- Message from Soham Santos sent at 3/4/2020  2:11 PM EST -----  Regarding: Dr. Ivory Hollingsworth first and last name:Douglas Metcalf      Reason for call: FMLA papers      Callback required yes/no and why:yes      Best contact number(s):[phone]      Details to clarify the request: Patient's job is requesting FMLA papers completed today for the patient.       Soham Santos

## 2020-03-30 ENCOUNTER — TELEPHONE (OUTPATIENT)
Dept: INTERNAL MEDICINE CLINIC | Age: 55
End: 2020-03-30

## 2020-03-30 NOTE — TELEPHONE ENCOUNTER
Pt called in reference to wanting Dr. Hannah Tarango to give him a note to return to work on Wednesday with light duties. Please call him at [phone].

## 2020-04-03 NOTE — TELEPHONE ENCOUNTER
Patient was already given a note to return to work on April 1, 2020  Fariba Guido LPN  4/7/9727  6:37 PM

## 2020-05-13 ENCOUNTER — TELEPHONE (OUTPATIENT)
Dept: INTERNAL MEDICINE CLINIC | Age: 55
End: 2020-05-13

## 2020-05-13 NOTE — TELEPHONE ENCOUNTER
----- Message from Esteban Gardiner sent at 5/13/2020  9:35 AM EDT -----  Regarding: [facility] / [place] Message/Vendor Calls      Eduin Schumacher daughter is requesting to speak with nurse in regard to picking up unemployment documentation.           Callback required :      Best contact number(s):[phone]                    Esteban Gardiner

## 2020-05-13 NOTE — TELEPHONE ENCOUNTER
----- Message from Veronika Mendoza sent at 2020 11:42 AM EDT -----  Regarding: Dr. Bakari Finch / [facility] Cruz Message/Vendor Calls    To: [facility]  Subject: Dr. Bakari Finch / Telephone  Patient's first and last name: Cecilia Bonds  : 1965  ID numbers: #[id number]    Caller's first and last name: Nolvia Nieves, Daughter  Reason for call: Pt daughter calling to check on the status of documents needed for pt unemployment. Pt was told forms were mailed on 20 but he has not received anything as of yet. Please call asap.   Callback required yes/no and why: Yes  Best contact number(s): [phone]  Details to clarify the request: N/A

## 2020-06-29 ENCOUNTER — HOSPITAL ENCOUNTER (EMERGENCY)
Age: 55
Discharge: HOME | End: 2020-06-29
Attending: EMERGENCY MEDICINE
Payer: COMMERCIAL

## 2020-06-29 ENCOUNTER — APPOINTMENT (OUTPATIENT)
Dept: GENERAL RADIOLOGY | Age: 55
End: 2020-06-29
Attending: EMERGENCY MEDICINE
Payer: COMMERCIAL

## 2020-06-29 ENCOUNTER — APPOINTMENT (OUTPATIENT)
Dept: ULTRASOUND IMAGING | Age: 55
End: 2020-06-29
Payer: COMMERCIAL

## 2020-06-29 VITALS
DIASTOLIC BLOOD PRESSURE: 95 MMHG | BODY MASS INDEX: 32.7 KG/M2 | RESPIRATION RATE: 16 BRPM | WEIGHT: 246.69 LBS | OXYGEN SATURATION: 97 % | HEIGHT: 73 IN | TEMPERATURE: 98.1 F | SYSTOLIC BLOOD PRESSURE: 135 MMHG | HEART RATE: 88 BPM

## 2020-06-29 DIAGNOSIS — M17.11 ARTHRITIS OF KNEE, RIGHT: ICD-10-CM

## 2020-06-29 DIAGNOSIS — M71.21 SYNOVIAL CYST OF RIGHT POPLITEAL SPACE: Primary | ICD-10-CM

## 2020-06-29 PROCEDURE — 99283 EMERGENCY DEPT VISIT LOW MDM: CPT

## 2020-06-29 PROCEDURE — 73562 X-RAY EXAM OF KNEE 3: CPT

## 2020-06-29 PROCEDURE — 93971 EXTREMITY STUDY: CPT

## 2020-06-29 PROCEDURE — 74011250637 HC RX REV CODE- 250/637: Performed by: EMERGENCY MEDICINE

## 2020-06-29 RX ORDER — DICLOFENAC SODIUM 10 MG/G
2 GEL TOPICAL 4 TIMES DAILY
Qty: 1 EACH | Refills: 0 | Status: SHIPPED | OUTPATIENT
Start: 2020-06-29 | End: 2020-07-06

## 2020-06-29 RX ORDER — NAPROXEN 250 MG/1
500 TABLET ORAL
Status: COMPLETED | OUTPATIENT
Start: 2020-06-29 | End: 2020-06-29

## 2020-06-29 RX ADMIN — NAPROXEN 500 MG: 250 TABLET ORAL at 15:54

## 2020-06-29 NOTE — ED PROVIDER NOTES
EMERGENCY DEPARTMENT HISTORY AND PHYSICAL EXAM      Date: 6/29/2020  Patient Name: Vianey Hansen    History of Presenting Illness     Chief Complaint   Patient presents with    Leg Swelling     pain and swelling to RLE; just above knee to  just below knee - all posterior x 1 week       History Provided By: Patient    HPI: Vianey Hansen, 48 y.o. male with PMHx as noted below presents the emergency department chief complaint of right knee pain. Patient notes posterior knee and upper calf pain for the last 1 to 2 weeks. He denies any trauma to the area and has noted no swelling. Patient states the pain seems to be worsening and described as a dull ache. The pain is worse with bending and weightbearing although notes he has been able to get around and perform his ADLs. Denies any fevers, chills or trauma to the area. PCP: Gareth Denson MD    Current Outpatient Medications   Medication Sig Dispense Refill    diclofenac (Voltaren) 1 % gel Apply 2 g to affected area four (4) times daily for 7 days. 1 Each 0       Past History     Past Medical History:  Past Medical History:   Diagnosis Date    Depression     Headache     Rotator cuff dysfunction 2013       Past Surgical History:  Past Surgical History:   Procedure Laterality Date    HX HERNIA REPAIR  05/2018       Family History:  Family History   Problem Relation Age of Onset    Diabetes Mother     Heart Disease Father        Social History:  Social History     Tobacco Use    Smoking status: Never Smoker    Smokeless tobacco: Never Used   Substance Use Topics    Alcohol use: No     Alcohol/week: 0.0 standard drinks     Comment: Occasional    Drug use: No       Allergies: Allergies   Allergen Reactions    Tramadol Anaphylaxis    Ibuprofen Nausea Only    Keflex [Cephalexin] Itching    Morphine Hives    Pcn [Penicillins] Angioedema         Review of Systems   Review of Systems  Constitutional: Negative for fever, chills, and fatigue. HENT: Negative for congestion, sore throat, rhinorrhea, sneezing and neck stiffness   Eyes: Negative for discharge and redness. Respiratory: Negative for  shortness of breath, wheezing   Cardiovascular: Negative for chest pain, palpitations   Gastrointestinal: Negative for nausea, vomiting, abdominal pain, constipation, diarrhea and blood in stool. Genitourinary: Negative for dysuria, hematuria, flank pain, decreased urine volume, discharge,   Musculoskeletal: Negative for myalgias. Positive knee pain. Skin: Negative for rash or lesions . Neurological: Negative weakness, light-headedness, numbness and headaches. Physical Exam   Physical Exam    GENERAL: alert and oriented, no acute distress  EYES: PEERL, No injection, discharge or icterus. ENT: Mucous membranes pink and moist.  NECK: Supple  LUNGS: Airway patent. Non-labored respirations. Breath sounds clear with good air entry bilaterally. HEART: Regular rate and rhythm. No peripheral edema  ABDOMEN: Non-distended and non-tender, without guarding or rebound. SKIN:  warm, dry  MSK/EXTREMITIES: Without swelling,or deformity, symmetric with normal ROM however noted to have some discomfort on palpation to the right knee specifically in the popliteal fossa. There is no warmth or erythema. NEUROLOGICAL: Alert, oriented      Diagnostic Study Results     Labs -   No results found for this or any previous visit (from the past 12 hour(s)). Radiologic Studies -   XR KNEE RT 3 V   Final Result   IMPRESSION:    1. Tricompartment osteoarthritis. CT Results  (Last 48 hours)    None        CXR Results  (Last 48 hours)    None            Medical Decision Making   IJacqiue MD am the first provider for this patient and am the attending of record for this patient encounter. I reviewed the vital signs, available nursing notes, past medical history, past surgical history, family history and social history.     Vital Signs-Reviewed the patient's vital signs. Patient Vitals for the past 12 hrs:   Temp Pulse Resp BP SpO2   06/29/20 1615    (!) 135/95 97 %   06/29/20 1444 98.1 °F (36.7 °C) 88 16 131/88 97 %     Records Reviewed: Nursing Notes and Old Medical Records    Provider Notes (Medical Decision Making): On presentation the patient is well appearing, in no acute distress with normal vital signs. Based on the history and exam the differential diagnosis for this patient includes  bursitis, osteoarthritis,tendinopathy, fracture, quadricep or patella tendon rupture, Baker's cyst, DVT. There are no history or exam findings consistent with septic arthritis. Xray demonstrated  osteoarthritis without any signs of acute fracture or dislocation. Ultrasound notable for Baker's cyst.  t. Instructed to follow up with orthopedics this week. Instructed on safe use of NSAIDS along with the possible GI, Cardio and renal side effects of chronic use. ED Course:   Initial assessment performed. The patients presenting problems have been discussed, and they are in agreement with the care plan formulated and outlined with them. I have encouraged them to ask questions as they arise throughout their visit. PROGRESS  Mimi Soledad Wilmer's  results have been reviewed with him. He has been counseled regarding his diagnosis. He verbally conveys understanding and agreement of the signs, symptoms, diagnosis, treatment and prognosis and additionally agrees to follow up as recommended with Dr. Osiris Yang MD in 24 - 48 hours. He also agrees with the care-plan and conveys that all of his questions have been answered.   I have also put together some discharge instructions for him that include: 1) educational information regarding their diagnosis, 2) how to care for their diagnosis at home, as well a 3) list of reasons why they would want to return to the ED prior to their follow-up appointment, should their condition change. Disposition:  home    PLAN:  1. Discharge Medication List as of 6/29/2020  4:54 PM      START taking these medications    Details   diclofenac (Voltaren) 1 % gel Apply 2 g to affected area four (4) times daily for 7 days. , Normal, Disp-1 Each, R-0           2. Follow-up Information     Follow up With Specialties Details Why Contact Info    Patricia Bustillos MD Family Practice Schedule an appointment as soon as possible for a visit in 2 days  [address]  [address] [facility] [phone]      [facility] EMERGENCY DEPT Emergency Medicine  If symptoms worsen [address]  [address] [facility]  [phone]    [facility]  Schedule an appointment as soon as possible for a visit in 3 days  [facility]  [address] [facility],[address]  [address] [facility]  [phone]        Return to ED if worse     Diagnosis     Clinical Impression:   1. Synovial cyst of right popliteal space    2. Arthritis of knee, right        Please note that this dictation was completed with Dragon, computer voice recognition software. Quite often unanticipated grammatical, syntax, homophones, and other interpretive errors are inadvertently transcribed by the computer software. Please disregard these errors. Additionally, please excuse any errors that have escaped final proofreading.

## 2020-06-29 NOTE — DISCHARGE INSTRUCTIONS
Patient Education        Knee Arthritis: Care Instructions  Your Care Instructions     Knee arthritis is a breakdown of the cartilage that cushions your knee joint. When the cartilage wears down, your bones rub against each other. This causes pain and stiffness. Knee arthritis tends to get worse with time. Treatment for knee arthritis involves reducing pain, making the leg muscles stronger, and staying at a healthy body weight. The treatment usually does not improve the health of the cartilage, but it can reduce pain and improve how well your knee works. You can take simple measures to protect your knee joints, ease your pain, and help you stay active. Follow-up care is a key part of your treatment and safety. Be sure to make and go to all appointments, and call your doctor if you are having problems. It's also a good idea to know your test results and keep a list of the medicines you take. How can you care for yourself at home? · Know that knee arthritis will cause more pain on some days than on others. · Stay at a healthy weight. Lose weight if you are overweight. When you stand up, the pressure on your knees from every pound of body weight is multiplied four times. So if you lose 10 pounds, you will reduce the pressure on your knees by 40 pounds. · Talk to your doctor or physical therapist about exercises that will help ease joint pain. ? Stretch to help prevent stiffness and to prevent injury before you exercise. You may enjoy gentle forms of yoga to help keep your knee joints and muscles flexible. ? Walk instead of jog.  ? Ride a bike. This makes your thigh muscles stronger and takes pressure off your knee. ? Wear well-fitting and comfortable shoes. ? Exercise in chest-deep water. This can help you exercise longer with less pain. ? Avoid exercises that include squatting or kneeling. They can put a lot of strain on your knees.   ? Talk to your doctor to make sure that the exercise you do is not making the arthritis worse. · Do not sit for long periods of time. Try to walk once in a while to keep your knee from getting stiff. · Ask your doctor or physical therapist whether shoe inserts may reduce your arthritis pain. · If you can afford it, get new athletic shoes at least every year. This can help reduce the strain on your knees. · Use a device to help you do everyday activities. ? A cane or walking stick can help you keep your balance when you walk. Hold the cane or walking stick in the hand opposite the painful knee. ? If you feel like you may fall when you walk, try using crutches or a front-wheeled walker. These can prevent falls that could cause more damage to your knee. ? A knee brace may help keep your knee stable and prevent pain. ? You also can use other things to make life easier, such as a higher toilet seat and handrails in the bathtub or shower. · Take pain medicines exactly as directed. ? Do not wait until you are in severe pain. You will get better results if you take it sooner. ? If you are not taking a prescription pain medicine, take an over-the-counter medicine such as acetaminophen (Tylenol), ibuprofen (Advil, Motrin), or naproxen (Aleve). Read and follow all instructions on the label. ? Do not take two or more pain medicines at the same time unless the doctor told you to. Many pain medicines have acetaminophen, which is Tylenol. Too much acetaminophen (Tylenol) can be harmful. ? Tell your doctor if you take a blood thinner, have diabetes, or have allergies to shellfish. · Ask your doctor if you might benefit from a shot of steroid medicine into your knee. This may provide pain relief for several months. · Many people take the supplements glucosamine and chondroitin for osteoarthritis. Some people feel they help, but the medical research does not show that they work. Talk to your doctor before you take these supplements. When should you call for help?    Call your doctor now or seek immediate medical care if:  · You have sudden swelling, warmth, or pain in your knee. · You have knee pain and a fever or rash. · You have such bad pain that you cannot use your knee. Watch closely for changes in your health, and be sure to contact your doctor if you have any problems. Where can you learn more? Go to http://jesse-trena.info/  Enter W187 in the search box to learn more about \"Knee Arthritis: Care Instructions. \"  Current as of: December 9, 2019               Content Version: 12.5  © 2666-7058 [employer]. Care instructions adapted under license by [employer] (which disclaims liability or warranty for this information). If you have questions about a medical condition or this instruction, always ask your healthcare professional. [place] [address] any warranty or liability for your use of this information. Patient Education        Knee Arthritis: Exercises  Introduction  Here are some examples of exercises for you to try. The exercises may be suggested for a condition or for rehabilitation. Start each exercise slowly. Ease off the exercises if you start to have pain. You will be told when to start these exercises and which ones will work best for you. How to do the exercises  Knee flexion with heel slide   1. Lie on your back with your knees bent. 2. Slide your heel back by bending your affected knee as far as you can. Then hook your other foot around your ankle to help pull your heel even farther back. 3. Hold for about 6 seconds, then rest for up to 10 seconds. 4. Repeat 8 to 12 times. 5. Switch legs and repeat steps 1 through 4, even if only one knee is sore. Quad sets   1. Sit with your affected leg straight and supported on the floor or a firm bed. Place a small, rolled-up towel under your knee. Your other leg should be bent, with that foot flat on the floor.   2. Tighten the thigh muscles of your affected leg by pressing the back of your knee down into the towel. 3. Hold for about 6 seconds, then rest for up to 10 seconds. 4. Repeat 8 to 12 times. 5. Switch legs and repeat steps 1 through 4, even if only one knee is sore. Straight-leg raises to the front   1. Lie on your back with your good knee bent so that your foot rests flat on the floor. Your affected leg should be straight. Make sure that your low back has a normal curve. You should be able to slip your hand in between the floor and the small of your back, with your palm touching the floor and your back touching the back of your hand. 2. Tighten the thigh muscles in your affected leg by pressing the back of your knee flat down to the floor. Hold your knee straight. 3. Keeping the thigh muscles tight and your leg straight, lift your affected leg up so that your heel is about 12 inches off the floor. Hold for about 6 seconds, then lower slowly. 4. Relax for up to 10 seconds between repetitions. 5. Repeat 8 to 12 times. 6. Switch legs and repeat steps 1 through 5, even if only one knee is sore. Active knee flexion   1. Lie on your stomach with your knees straight. If your kneecap is uncomfortable, roll up a washcloth and put it under your leg just above your kneecap. 2. Lift the foot of your affected leg by bending the knee so that you bring the foot up toward your buttock. If this motion hurts, try it without bending your knee quite as far. This may help you avoid any painful motion. 3. Slowly move your leg up and down. 4. Repeat 8 to 12 times. 5. Switch legs and repeat steps 1 through 4, even if only one knee is sore. Quadriceps stretch (facedown)   1. Lie flat on your stomach, and rest your face on the floor. 2. Wrap a towel or belt strap around the lower part of your affected leg. Then use the towel or belt strap to slowly pull your heel toward your buttock until you feel a stretch.   3. Hold for about 15 to 30 seconds, then relax your leg against the towel or belt strap. 4. Repeat 2 to 4 times. 5. Switch legs and repeat steps 1 through 4, even if only one knee is sore. Stationary exercise bike   1. If you do not have a stationary exercise bike at home, you can find one to ride at your local health club or community center. 2. Adjust the height of the bike seat so that your knee is slightly bent when your leg is extended downward. If your knee hurts when the pedal reaches the top, you can raise the seat so that your knee does not bend as much. 3. Start slowly. At first, try to do 5 to 10 minutes of cycling with little to no resistance. Then increase your time and the resistance bit by bit until you can do 20 to 30 minutes without pain. 4. If you start to have pain, rest your knee until your pain gets back to the level that is normal for you. Or cycle for less time or with less effort. Follow-up care is a key part of your treatment and safety. Be sure to make and go to all appointments, and call your doctor if you are having problems. It's also a good idea to know your test results and keep a list of the medicines you take. Where can you learn more? Go to http://jesse-trena.info/  Enter C159 in the search box to learn more about \"Knee Arthritis: Exercises. \"  Current as of: March 2, 2020               Content Version: 12.5  © 9191-4646 [employer]. Care instructions adapted under license by [employer] (which disclaims liability or warranty for this information). If you have questions about a medical condition or this instruction, always ask your healthcare professional. [place] [address] any warranty or liability for your use of this information. Patient Education        Chopra's Cyst: Care Instructions  Your Care Instructions     A Baker's cyst is a swelling behind the knee. It may cause pain or stiffness when you bend your knee or straighten it all the way.  Baker's cysts are also called popliteal cysts. If you have arthritis or another condition that is the cause of the Baker's cyst, your doctor may treat that condition. A Baker's cyst may go away on its own. If not, or if it is causing a lot of discomfort, your doctor may drain the fluid that has built up behind the knee. In some cases, a Baker's cyst is removed in surgery. There are things you can do at home, such as staying off your leg, to reduce the swelling and pain. Follow-up care is a key part of your treatment and safety. Be sure to make and go to all appointments, and call your doctor if you are having problems. It's also a good idea to know your test results and keep a list of the medicines you take. How can you care for yourself at home? · Rest your knee as much as possible. · Ask your doctor if you can take an over-the-counter pain medicine, such as acetaminophen (Tylenol), ibuprofen (Advil, Motrin), or naproxen (Aleve). Be safe with medicines. Read and follow all instructions on the label. · Use a cane, a crutch, a walker, or another device if you need help to get around. These can help rest your knees. · If you have an elastic bandage, make sure it is snug but not so tight that your leg is numb, tingles, or swells below the bandage. Ask your doctor if you can loosen the bandage if it is too tight. · Follow your doctor's instructions about how much weight you can put on your knee. · Stay at a healthy weight. Being overweight puts extra strain on your knee. When should you call for help? [id number] anytime you think you may need emergency care. For example, call if:  · You have chest pain, are short of breath, or you cough up blood. Call your doctor now or seek immediate medical care if:  · You have new or worse pain. · Your foot is cool or pale or changes color. · You have tingling, weakness, or numbness in your foot or toes.   · You have signs of a blood clot in your leg (called a deep vein thrombosis), such as:  ? Pain in your calf, back of the knee, thigh, or groin. ? Redness or swelling in your leg. Watch closely for changes in your health, and be sure to contact your doctor if:  · You do not get better as expected. Where can you learn more? Go to http://jesse-trena.info/  Enter Z711 in the search box to learn more about \"Baker's Cyst: Care Instructions. \"  Current as of: March 2, 2020               Content Version: 12.5  © 2006-2020 [employer]. Care instructions adapted under license by [employer] (which disclaims liability or warranty for this information). If you have questions about a medical condition or this instruction, always ask your healthcare professional. [address] any warranty or liability for your use of this information.

## 2020-06-29 NOTE — ED NOTES
Pt presents w/ c/o right knee pain and swelling x 3 weeks. Pt reports the swelling gets worse throughout the day.

## 2020-09-11 ENCOUNTER — VIRTUAL VISIT (OUTPATIENT)
Dept: INTERNAL MEDICINE CLINIC | Age: 55
End: 2020-09-11
Payer: COMMERCIAL

## 2020-09-11 DIAGNOSIS — R05.9 COUGH: Primary | ICD-10-CM

## 2020-09-11 PROCEDURE — 99442 PR PHYS/QHP TELEPHONE EVALUATION 11-20 MIN: CPT | Performed by: NURSE PRACTITIONER

## 2020-09-11 NOTE — PROGRESS NOTES
Kimberly Goff is a 48 y.o. male who was seen by synchronous (real-time) audio-video technology on 9/11/2020 for Cough (tested negative for COVID 2 days ago at Urgent Care - keeping him up at night) and Breathing Problem  Switched to phone call    Subjective:   Stated he was tested and was Covid negative. Pt reported nagging cough x 1 week. \"Keeps me up at night. \"  Stated he is \"sweating a lot \" but unsure if he has a temp? Stated Nyquil- did not help cough. Nonsmoker  Denied recreational drugs    Prior to Admission medications    Not on File     Past Medical History:   Diagnosis Date    Depression     Headache     Rotator cuff dysfunction 2013     Past Surgical History:   Procedure Laterality Date    HX HERNIA REPAIR  05/2018     Family History   Problem Relation Age of Onset    Diabetes Mother     Heart Disease Father      Social History     Tobacco Use    Smoking status: Never Smoker    Smokeless tobacco: Never Used   Substance Use Topics    Alcohol use: No     Alcohol/week: 0.0 standard drinks     Comment: Occasional     Review of Systems   Constitutional: Negative for chills and fever. Respiratory: Positive for cough. Negative for shortness of breath. Cardiovascular: Positive for chest pain. Neurological: Negative for dizziness and headaches. Objective:   No flowsheet data found. Mental status: [x] Alert and awake  [x] Oriented to person/place/time [x] Able to follow commands    [] Abnormal -     Pulmonary/Chest: [] Respiratory effort normal   [] No visualized signs of difficulty breathing or respiratory distress        [x] Abnormal - Mild shortness of breath when talking. Assessment & Plan:   DDDX: Bronchitis, Pneumonia, COVID    ICD-10-CM ICD-9-CM    1. Cough  R05 786.2      1. Cough  Suggested honey for his cough. Suggested pt could have bronchitis, pneumonia or COVID. Advised pt to go to ER and get evaluated.      Sent to ER  Called and gave report to Dr. Lexus Ely at [facility]. Follow up in 1 week. I spent at least 15 minutes on this visit with this established patient. We discussed the expected course, resolution and complications of the diagnosis(es) in detail. Medication risks, benefits, costs, interactions, and alternatives were discussed as indicated. I advised him to contact the office if his condition worsens, changes or fails to improve as anticipated. He expressed understanding with the diagnosis(es) and plan. Chalmers Mohs, who was evaluated through a patient-initiated, synchronous (real-time) audio-video encounter, and/or his healthcare decision maker, is aware that it is a billable service, with coverage as determined by his insurance carrier. He provided verbal consent to proceed: Yes, and patient identification was verified. It was conducted pursuant to the emergency declaration under the [address] [place] [address], [address] [place] [address] authority and the [employer] General Act. A caregiver was present when appropriate. Ability to conduct physical exam was limited. I was in the office. The patient was at home.       Lai Patel NP

## 2020-09-11 NOTE — PROGRESS NOTES
Chief Complaint   Patient presents with    Cough     tested negative for COVID 2 days ago at Urgent Care - keeping him up at night    Breathing Problem     Fall Risk Assessment, last 12 mths 9/11/2020   Able to walk? Yes   Fall in past 12 months? No       3 most recent PHQ Screens 9/11/2020   Little interest or pleasure in doing things Not at all   Feeling down, depressed, irritable, or hopeless Not at all   Total Score PHQ 2 0       Abuse Screening Questionnaire 9/11/2020   Do you ever feel afraid of your partner? N   Are you in a relationship with someone who physically or mentally threatens you? N   Is it safe for you to go home?  Y       ADL Assessment 9/11/2020   Feeding yourself No Help Needed   Getting from bed to chair No Help Needed   Getting dressed No Help Needed   Bathing or showering No Help Needed   Walk across the room (includes cane/walker) No Help Needed   Using the telphone No Help Needed   Taking your medications No Help Needed   Preparing meals No Help Needed   Managing money (expenses/bills) No Help Needed   Moderately strenuous housework (laundry) Help Needed   Shopping for personal items (toiletries/medicines) No Help Needed   Shopping for groceries No Help Needed   Driving Help Needed   Climbing a flight of stairs No Help Needed   Getting to places beyond walking distances Help Needed

## 2020-11-05 ENCOUNTER — TELEPHONE (OUTPATIENT)
Dept: INTERNAL MEDICINE CLINIC | Age: 55
End: 2020-11-05

## 2021-03-01 ENCOUNTER — OFFICE VISIT (OUTPATIENT)
Dept: INTERNAL MEDICINE CLINIC | Age: 56
End: 2021-03-01
Payer: COMMERCIAL

## 2021-03-01 VITALS
HEIGHT: 73 IN | RESPIRATION RATE: 16 BRPM | SYSTOLIC BLOOD PRESSURE: 134 MMHG | OXYGEN SATURATION: 98 % | BODY MASS INDEX: 32.74 KG/M2 | WEIGHT: 247 LBS | TEMPERATURE: 97.4 F | DIASTOLIC BLOOD PRESSURE: 86 MMHG

## 2021-03-01 DIAGNOSIS — Z12.11 COLON CANCER SCREENING: ICD-10-CM

## 2021-03-01 DIAGNOSIS — E78.2 MIXED HYPERLIPIDEMIA: ICD-10-CM

## 2021-03-01 DIAGNOSIS — M17.11 ARTHRITIS OF RIGHT KNEE: Primary | ICD-10-CM

## 2021-03-01 DIAGNOSIS — Z12.5 SCREENING FOR PROSTATE CANCER: ICD-10-CM

## 2021-03-01 DIAGNOSIS — Z13.1 SCREENING FOR DIABETES MELLITUS: ICD-10-CM

## 2021-03-01 DIAGNOSIS — M19.071 ARTHRITIS OF RIGHT ANKLE: ICD-10-CM

## 2021-03-01 PROCEDURE — 99214 OFFICE O/P EST MOD 30 MIN: CPT | Performed by: FAMILY MEDICINE

## 2021-03-01 RX ORDER — DICLOFENAC SODIUM 50 MG/1
50 TABLET, DELAYED RELEASE ORAL 2 TIMES DAILY
Qty: 60 TAB | Refills: 1 | Status: SHIPPED | OUTPATIENT
Start: 2021-03-01 | End: 2021-06-03 | Stop reason: ALTCHOICE

## 2021-03-01 NOTE — PROGRESS NOTES
HISTORY OF PRESENT ILLNESS  Suzanna Alcazar is a 48 y.o. male. Ankle Pain   The history is provided by the patient. This is a chronic problem. Episode onset: months. The problem occurs constantly. The problem has been gradually worsening. The pain is present in the right ankle and right knee. The quality of the pain is described as aching. The pain is at a severity of 8/10. Treatments tried: saw ortho  The treatment provided no (didn't take meds recommended) relief. There has been no history of extremity trauma. He has been to see orthopedics, declined injection and did not take medicines recommended. His wife said he should do neither but his problem has worsened and the pain is now constant. Desires to be screened for prostate and colon cancer. Review of Systems   Constitutional: Negative for weight loss. Musculoskeletal: Positive for joint pain. Negative for falls.      Patient Active Problem List   Diagnosis Code    Ventral hernia without obstruction or gangrene K43.9    Arthritis of knee, right M17.11     Social History     Socioeconomic History    Marital status:      Spouse name: Not on file    Number of children: 3    Years of education: Not on file    Highest education level: Not on file   Occupational History    Occupation: break shoes   Social Needs    Financial resource strain: Not on file    Food insecurity     Worry: Not on file     Inability: Not on file   Di[employer]com needs     Medical: Not on file     Non-medical: Not on file   Tobacco Use    Smoking status: Never Smoker    Smokeless tobacco: Never Used   Substance and Sexual Activity    Alcohol use: No     Alcohol/week: 0.0 standard drinks     Comment: Occasional    Drug use: No    Sexual activity: Yes     Partners: Female   Lifestyle    Physical activity     Days per week: Not on file     Minutes per session: Not on file    Stress: Not on file   Relationships    Social connections     Talks on phone: Not on file     Gets together: Not on file     Attends Latter-day service: Not on file     Active member of club or organization: Not on file     Attends meetings of clubs or organizations: Not on file     Relationship status: Not on file    Intimate partner violence     Fear of current or ex partner: Not on file     Emotionally abused: Not on file     Physically abused: Not on file     Forced sexual activity: Not on file   Other Topics Concern    Not on file   Social History Narrative    Living with wife and 3 kids   Works at the [address] [place] [address] on his feet and by the end of the day pain is severe. Physical Exam  Visit Vitals  /86 (BP 1 Location: Left arm, BP Patient Position: Sitting, BP Cuff Size: Adult)   Temp 97.4 °F (36.3 °C) (Temporal)   Resp 16   Ht 6' 1\" (1.854 m)   Wt 247 lb (112 kg)   SpO2 98%   BMI 32.59 kg/m²     Right knee grossly unremarkable, full range of motion no obvious effusion no redness or swelling, Lachman test negative. Ankle also grossly unremarkable no redness or effusion. X-ray shows arthritis  ASSESSMENT and PLAN  Encounter Diagnoses   Name Primary?  Arthritis of right knee Yes    Arthritis of right ankle     Screening for prostate cancer     Screening for diabetes mellitus     Mixed hyperlipidemia     Colon cancer screening      Orders Placed This Encounter    AMB SUPPLY ORDER    AMB SUPPLY ORDER    METABOLIC PANEL, COMPREHENSIVE    LIPID PANEL    PROSTATE SPECIFIC AG    OCCULT BLOOD IMMUNOASSAY,DIAGNOSTIC    diclofenac EC (VOLTAREN) 50 mg EC tablet     Discussed possible side affects, precautions, and drug interactions and possible benefits of the medication(s). Follow-up and Dispositions    · Return in about 3 months (around 6/1/2021) for routine follow up.

## 2021-04-10 LAB
ALBUMIN SERPL-MCNC: 4.4 G/DL (ref 3.8–4.9)
ALBUMIN/GLOB SERPL: 1.9 {RATIO} (ref 1.2–2.2)
ALP SERPL-CCNC: 89 IU/L (ref 39–117)
ALT SERPL-CCNC: 13 IU/L (ref 0–44)
AST SERPL-CCNC: 17 IU/L (ref 0–40)
BILIRUB SERPL-MCNC: 0.2 MG/DL (ref 0–1.2)
BUN SERPL-MCNC: 16 MG/DL (ref 6–24)
BUN/CREAT SERPL: 14 (ref 9–20)
CALCIUM SERPL-MCNC: 9.6 MG/DL (ref 8.7–10.2)
CHLORIDE SERPL-SCNC: 104 MMOL/L (ref 96–106)
CHOLEST SERPL-MCNC: 210 MG/DL (ref 100–199)
CO2 SERPL-SCNC: 25 MMOL/L (ref 20–29)
CREAT SERPL-MCNC: 1.18 MG/DL (ref 0.76–1.27)
GLOBULIN SER CALC-MCNC: 2.3 G/DL (ref 1.5–4.5)
GLUCOSE SERPL-MCNC: 93 MG/DL (ref 65–99)
HDLC SERPL-MCNC: 38 MG/DL
IMP & REVIEW OF LAB RESULTS: NORMAL
LDLC SERPL CALC-MCNC: 133 MG/DL (ref 0–99)
POTASSIUM SERPL-SCNC: 4.6 MMOL/L (ref 3.5–5.2)
PROT SERPL-MCNC: 6.7 G/DL (ref 6–8.5)
PSA SERPL-MCNC: 1.1 NG/ML (ref 0–4)
SODIUM SERPL-SCNC: 140 MMOL/L (ref 134–144)
TRIGL SERPL-MCNC: 218 MG/DL (ref 0–149)
VLDLC SERPL CALC-MCNC: 39 MG/DL (ref 5–40)

## 2021-06-03 ENCOUNTER — VIRTUAL VISIT (OUTPATIENT)
Dept: INTERNAL MEDICINE CLINIC | Age: 56
End: 2021-06-03
Payer: COMMERCIAL

## 2021-06-03 DIAGNOSIS — M19.071 ARTHRITIS OF RIGHT ANKLE: ICD-10-CM

## 2021-06-03 DIAGNOSIS — E78.5 HYPERLIPIDEMIA, UNSPECIFIED HYPERLIPIDEMIA TYPE: ICD-10-CM

## 2021-06-03 DIAGNOSIS — M17.11 ARTHRITIS OF KNEE, RIGHT: Primary | ICD-10-CM

## 2021-06-03 PROCEDURE — 99214 OFFICE O/P EST MOD 30 MIN: CPT | Performed by: FAMILY MEDICINE

## 2021-06-03 RX ORDER — CYCLOBENZAPRINE HCL 5 MG
5 TABLET ORAL
Qty: 60 TABLET | Refills: 0 | Status: SHIPPED | OUTPATIENT
Start: 2021-06-03 | End: 2022-02-02 | Stop reason: ALTCHOICE

## 2021-06-03 NOTE — PROGRESS NOTES
Chief Complaint   Patient presents with    Ankle Pain     FU     Patient is aware that this is a Virtual Visit or Phone Call Only doctor's visit. Patient has not been out of the country in (14 months), NO diarrhea, NO cough, NO chest conjestion, NO temp. Pt has not been around anyone with these symptoms. Health Maintenance reviewed. I have reviewed the patient's medical history in detail and updated the computerized patient record. 1. Have you been to the ER, urgent care clinic since your last visit? Hospitalized since your last visit?  no    2. Have you seen or consulted any other health care providers outside of the [address] [place] [address] since your last visit? no Include any pap smears or colon screening. Encouraged pt to discuss pt's wishes with spouse/partner/family and bring them in the next appt to follow thru with the Advanced Directive      Fall Risk Assessment, last 12 mths 3/1/2021   Able to walk? Yes   Fall in past 12 months? 0   Do you feel unsteady? 0   Are you worried about falling 0       3 most recent PHQ Screens 6/3/2021   Little interest or pleasure in doing things Several days   Feeling down, depressed, irritable, or hopeless Several days   Total Score PHQ 2 2       Abuse Screening Questionnaire 6/3/2021   Do you ever feel afraid of your partner? N   Are you in a relationship with someone who physically or mentally threatens you? N   Is it safe for you to go home?  Y       ADL Assessment 6/3/2021   Feeding yourself No Help Needed   Getting from bed to chair No Help Needed   Getting dressed No Help Needed   Bathing or showering No Help Needed   Walk across the room (includes cane/walker) No Help Needed   Using the telphone No Help Needed   Taking your medications No Help Needed   Preparing meals No Help Needed   Managing money (expenses/bills) No Help Needed   Moderately strenuous housework (laundry) No Help Needed   Shopping for personal items (toiletries/medicines) No Help Needed   Shopping for groceries No Help Needed   Driving No Help Needed   Climbing a flight of stairs No Help Needed   Getting to places beyond walking distances No Help Needed

## 2021-06-03 NOTE — PROGRESS NOTES
HISTORY OF PRESENT ILLNESS  Reese Raza is a 55 y.o. male. Ankle Pain   The history is provided by the patient. This is a chronic problem. Episode onset: months knee months ankle, right. The problem occurs constantly. The problem has been gradually worsening. The pain is present in the right ankle and right knee. The quality of the pain is described as aching. The pain is severe. Treatments tried: diclofenac. The treatment provided no relief. There has been no history of extremity trauma. Review of Systems   Musculoskeletal: Positive for joint pain. Negative for falls and myalgias. joints well ache, right only    Social History     Socioeconomic History    Marital status:      Spouse name: Not on file    Number of children: 3    Years of education: Not on file    Highest education level: Not on file   Occupational History    Occupation: break shoes   Tobacco Use    Smoking status: Never Smoker    Smokeless tobacco: Never Used   Substance and Sexual Activity    Alcohol use: No     Alcohol/week: 0.0 standard drinks     Comment: Occasional    Drug use: No    Sexual activity: Yes     Partners: Female   Other Topics Concern    Not on file   Social History Narrative    Living with wife and 3 kids     Social Determinants of Health     Financial Resource Strain:     Difficulty of Paying Living Expenses:    Food Insecurity:     Worried About Running Out of Food in the Last Year:     [address] in the Last Year:    Transportation Needs:     Lack of Transportation (Medical):      Lack of Transportation (Non-Medical):    Physical Activity:     Days of Exercise per Week:     Minutes of Exercise per Session:    Stress:     Feeling of Stress :    Social Connections:     Frequency of Communication with Friends and Family:     Frequency of Social Gatherings with Friends and Family:     Attends Hinduism Services:     Active Member of Clubs or Organizations:     Attends Club or Organization Meetings:     Marital Status:    Intimate Partner Violence:     Fear of Current or Ex-Partner:     Emotionally Abused:     Physically Abused:     Sexually Abused:        Physical Exam  There were no vitals taken for this visit. WD WN male NAD  Labs from today were reviewed  no, labs done previously were reviewed  yes, Labs done in ER were reviewed  yes, Additional labs are ordered  no,        ASSESSMENT and PLAN  Encounter Diagnoses   Name Primary?  Arthritis of knee, right Yes    Arthritis of right ankle     Hyperlipidemia, unspecified hyperlipidemia type      Orders Placed This Encounter    REFERRAL TO ORTHOPEDICS    cyclobenzaprine (FLEXERIL) 5 mg tablet     Discussed possible side affects, precautions, and drug interactions and possible benefits of the medication(s). F/up kaylee Patricio, who was evaluated through a synchronous (real-time) audio-video encounter, and/or his healthcare decision maker, is aware that it is a billable service, with coverage as determined by his insurance carrier. He provided verbal consent to proceed: Yes, and patient identification was verified. This visit was conducted pursuant to the emergency declaration under the [facility], [address] [place] [address] authority and the [employer] and [employer] General Act. A caregiver was present when appropriate. Ability to conduct physical exam was limited. The patient was located in a state where the provider was credentialed to provide care.      --Peter Bonilla MD on 6/3/2021 at 8:35 AM

## 2021-06-07 ENCOUNTER — APPOINTMENT (RX ONLY)
Dept: URBAN - METROPOLITAN AREA CLINIC 151 | Facility: CLINIC | Age: 56
Setting detail: DERMATOLOGY
End: 2021-06-07

## 2021-06-07 DIAGNOSIS — L40.1 GENERALIZED PUSTULAR PSORIASIS: ICD-10-CM

## 2021-06-07 PROCEDURE — ? PRESCRIPTION MEDICATION MANAGEMENT

## 2021-06-07 PROCEDURE — ? COUNSELING

## 2021-06-07 PROCEDURE — ? PRESCRIPTION

## 2021-06-07 PROCEDURE — ? DIAGNOSIS COMMENT

## 2021-06-07 PROCEDURE — 99204 OFFICE O/P NEW MOD 45 MIN: CPT

## 2021-06-07 RX ORDER — HALOBETASOL PROPIONATE AND TAZAROTENE .1; .45 MG/G; MG/G
LOTION TOPICAL BID
Qty: 1 | Refills: 4 | Status: ERX | COMMUNITY
Start: 2021-06-07

## 2021-06-07 RX ADMIN — HALOBETASOL PROPIONATE AND TAZAROTENE: .1; .45 LOTION TOPICAL at 00:00

## 2021-06-07 ASSESSMENT — LOCATION SIMPLE DESCRIPTION DERM
LOCATION SIMPLE: LEFT PLANTAR SURFACE
LOCATION SIMPLE: LEFT MIDDLE FINGER
LOCATION SIMPLE: RIGHT INDEX FINGER

## 2021-06-07 ASSESSMENT — LOCATION ZONE DERM
LOCATION ZONE: FEET
LOCATION ZONE: FINGER

## 2021-06-07 ASSESSMENT — LOCATION DETAILED DESCRIPTION DERM
LOCATION DETAILED: LEFT MIDDLE DISTAL INTERPHALANGEAL JOINT
LOCATION DETAILED: RIGHT MID DORSAL INDEX FINGER
LOCATION DETAILED: LEFT INSTEP

## 2021-06-07 NOTE — PROCEDURE: DIAGNOSIS COMMENT
Comment: Discussed nature and etiology of rash areas. Pt denies any joint pain or any other rash areas outside of digits and plantar arch. He also denies a family hx of Psoriasis. Will treat conservatively with topical steroid, Duobrii BID.
Detail Level: Simple
Render Risk Assessment In Note?: no

## 2021-09-27 ENCOUNTER — HOSPITAL ENCOUNTER (EMERGENCY)
Age: 56
Discharge: LEFT BEFORE DISCHARGE | End: 2021-09-27
Payer: COMMERCIAL

## 2021-09-27 PROCEDURE — 75810000275 HC EMERGENCY DEPT VISIT NO LEVEL OF CARE

## 2022-01-17 ENCOUNTER — VIRTUAL VISIT (OUTPATIENT)
Dept: INTERNAL MEDICINE CLINIC | Age: 57
End: 2022-01-17
Payer: COMMERCIAL

## 2022-01-17 DIAGNOSIS — U07.1 COVID-19: Primary | ICD-10-CM

## 2022-01-17 PROCEDURE — 99213 OFFICE O/P EST LOW 20 MIN: CPT | Performed by: FAMILY MEDICINE

## 2022-01-17 NOTE — LETTER
NOTIFICATION RETURN TO WORK    1/17/2022 10:03 AM    Mr. Cj Navarro  [facility] [address]      To Whom It May Concern:    Cj Navarro is currently under the care of [address]. He will return to work on Thursday, 20, 2022. If there are questions or concerns please have the patient contact our office.         Sincerely,      Arash Rosales MD

## 2022-01-17 NOTE — PROGRESS NOTES
Subjective:   Дмитрий Jose is a 55 y.o. male who complains of dry cough, headache and abdo pain and diarrhea for 7 days, gradually improving since that time. Still some cough  He denies a history of fevers and shortness of breath.  + test covid Jan 6  Evaluation to date: seen [place]  + covid needs retesting before can go back to work, per daughter. Treatment to date: none. Patient does not smoke cigarettes. Relevant PMH: No pertinent additional PMH. Patient Active Problem List    Diagnosis Date Noted    Arthritis of knee, right 03/01/2021    Ventral hernia without obstruction or gangrene 05/13/2018     Current Outpatient Medications   Medication Sig Dispense Refill    cyclobenzaprine (FLEXERIL) 5 mg tablet Take 1 Tablet by mouth three (3) times daily as needed for Muscle Spasm(s). 60 Tablet 0     Allergies   Allergen Reactions    Tramadol Anaphylaxis    Ibuprofen Nausea Only    Keflex [Cephalexin] Itching    Morphine Hives    Pcn [Penicillins] Angioedema     Social History     Tobacco Use    Smoking status: Never Smoker    Smokeless tobacco: Never Used   Substance Use Topics    Alcohol use: No     Alcohol/week: 0.0 standard drinks     Comment: Occasional        Review of Systems  Pertinent items are noted in HPI. Objective: There were no vitals taken for this visit. General:  alert, cooperative, no distress   Eyes:    Ears:    Sinuses:    Mouth:     Neck:    Heart:    Lungs:    Abdomen:       Assessment/Plan:   Pneumonia from covid with recovery  Suggested symptomatic OTC remedies. RTC prn. Discussed diagnosis and treatment of viral URIs. Discussed the importance of avoiding unnecessary antibiotic therapy. Encounter Diagnoses   Name Primary?  COVID-19 Yes     Orders Placed This Encounter    NOVEL CORONAVIRUS (COVID-19) ([facility] Default)   . Дмитрий Jose is a 55 y.o. male who was phone evaluated on 1/17/2022.       Consent:  He and/or his healthcare decision maker is aware that this patient-initiated Telehealth encounter is a billable service, with coverage as determined by his insurance carrier. He is aware that he may receive a bill and has provided verbal consent to proceed: Yes    I was at home while conducting this encounter. Assessment & Plan:   Diagnoses and all orders for this visit:    1. COVID-19  -     NOVEL CORONAVIRUS (COVID-19)      Orders Placed This Encounter    NOVEL CORONAVIRUS (COVID-19) ([facility] Default)     Scheduling Instructions:      1) Due to current limited availability of the COVID-19 PCR test, tests will be prioritized and may not be completed.              2) Order only if the test result will change clinical management or necessary for a return to mission-critical employment decision.              3) Print and instruct patient to adhere to CDC home isolation program. (Link Above)              4) Set up or refer patient for a monitoring program.              5) Have patient sign up for and leverage [employer] (if not previously done). Order Specific Question:   Is this test for diagnosis or screening? Answer:   Diagnosis of ill patient     Order Specific Question:   Symptomatic for COVID-19 as defined by CDC? Answer:   Yes     Order Specific Question:   Date of Symptom Onset     Answer:   1/6/2022     Order Specific Question:   Hospitalized for COVID-19? Answer:   No     Order Specific Question:   Admitted to ICU for COVID-19? Answer:   No     Order Specific Question:   Employed in healthcare setting? Answer:   No     Order Specific Question:   Resident in a congregate (group) care setting? Answer:   No     Order Specific Question:   Previously tested for COVID-19? Answer:   Yes           712  Subjective:      See above      We discussed the expected course, resolution and complications of the diagnosis(es) in detail. Medication risks, benefits, costs, interactions, and alternatives were discussed as indicated. I advised him to contact the office if his condition worsens, changes or fails to improve as anticipated. He expressed understanding with the diagnosis(es) and plan. Pursuant to the emergency declaration under the [facility], [facility] waiver authority and the [employer] Act, this Virtual  Visit was conducted, with patient's consent, to reduce the patient's risk of exposure to COVID-19 and provide continuity of care for an established patient. Services were provided through a video synchronous discussion virtually to substitute for in-person clinic visit.     Chema Perdomo MD

## 2022-01-17 NOTE — PROGRESS NOTES
Chief Complaint   Patient presents with    Concern For COVID-19 (Coronavirus)     need a negative covid test to return to work     Patient is aware that this is a Virtual Visit or Phone Call Only doctor's visit. Patient has not been out of the country in (14 months), NO diarrhea, NO cough, NO chest conjestion, NO temp. Pt has not been around anyone with these symptoms. Health Maintenance reviewed. I have reviewed the patient's medical history in detail and updated the computerized patient record. 1. Have you been to the ER, urgent care clinic since your last visit? no Hospitalized since your last visit?  no    2. Have you seen or consulted any other health care providers outside of the [address] [place] [address] since your last visit? No  Include any pap smears or colon screening. Encouraged pt to discuss pt's wishes with spouse/partner/family and bring them in the next appt to follow thru with the Advanced Directive      Fall Risk Assessment, last 12 mths 3/1/2021   Able to walk? Yes   Fall in past 12 months? 0   Do you feel unsteady? 0   Are you worried about falling 0       3 most recent PHQ Screens 6/3/2021   Little interest or pleasure in doing things Several days   Feeling down, depressed, irritable, or hopeless Several days   Total Score PHQ 2 2       Abuse Screening Questionnaire 6/3/2021   Do you ever feel afraid of your partner? N   Are you in a relationship with someone who physically or mentally threatens you? N   Is it safe for you to go home?  Y       ADL Assessment 6/3/2021   Feeding yourself No Help Needed   Getting from bed to chair No Help Needed   Getting dressed No Help Needed   Bathing or showering No Help Needed   Walk across the room (includes cane/walker) No Help Needed   Using the telphone No Help Needed   Taking your medications No Help Needed   Preparing meals No Help Needed   Managing money (expenses/bills) No Help Needed   Moderately strenuous housework (laundry) No Help Needed   Shopping for personal items (toiletries/medicines) No Help Needed   Shopping for groceries No Help Needed   Driving No Help Needed   Climbing a flight of stairs No Help Needed   Getting to places beyond walking distances No Help Needed     COVID-19 test was taken to the parking lot and pt did not have any difficulties swabbing themselves. Pt tolerated well and all protective standard protocols were taken.

## 2022-01-20 LAB — SARS-COV-2, NAA: NOT DETECTED

## 2022-02-02 ENCOUNTER — OFFICE VISIT (OUTPATIENT)
Dept: INTERNAL MEDICINE CLINIC | Age: 57
End: 2022-02-02
Payer: COMMERCIAL

## 2022-02-02 VITALS
WEIGHT: 249 LBS | SYSTOLIC BLOOD PRESSURE: 132 MMHG | HEIGHT: 73 IN | RESPIRATION RATE: 16 BRPM | HEART RATE: 69 BPM | OXYGEN SATURATION: 97 % | DIASTOLIC BLOOD PRESSURE: 89 MMHG | TEMPERATURE: 98.8 F | BODY MASS INDEX: 33 KG/M2

## 2022-02-02 DIAGNOSIS — M17.11 ARTHRITIS OF KNEE, RIGHT: Primary | ICD-10-CM

## 2022-02-02 PROCEDURE — 99213 OFFICE O/P EST LOW 20 MIN: CPT | Performed by: FAMILY MEDICINE

## 2022-02-02 RX ORDER — DICLOFENAC SODIUM 10 MG/G
2 GEL TOPICAL 4 TIMES DAILY
Qty: 1 EACH | Refills: 3 | Status: SHIPPED | OUTPATIENT
Start: 2022-02-02 | End: 2022-06-28 | Stop reason: ALTCHOICE

## 2022-02-02 RX ORDER — CYCLOBENZAPRINE HCL 5 MG
5 TABLET ORAL
Qty: 60 TABLET | Refills: 0 | Status: SHIPPED | OUTPATIENT
Start: 2022-02-02 | End: 2022-06-28 | Stop reason: ALTCHOICE

## 2022-02-02 NOTE — PROGRESS NOTES
Chief Complaint   Patient presents with    Ankle Pain     R/lower leg pain traveling down to R/ankle     Patient has not been out of the country in (14 months), NO diarrhea, NO cough, NO chest conjestion, NO temp. Pt has not been around anyone with these symptoms. Health Maintenance reviewed. I have reviewed the patient's medical history in detail and updated the computerized patient record. 1. Have you been to the ER, urgent care clinic since your last visit? No    Hospitalized since your last visit? no      2. Have you seen or consulted any other health care providers outside of the [address] [place] [address] since your last visit? no Include any pap smears or colon screening. Encouraged pt to discuss pt's wishes with spouse/partner/family and bring them in the next appt to follow thru with the Advanced Directive    @  [address], last 12 mths 3/1/2021   Able to walk? Yes   Fall in past 12 months? 0   Do you feel unsteady? 0   Are you worried about falling 0       3 most recent PHQ Screens 2/2/2022   Little interest or pleasure in doing things Several days   Feeling down, depressed, irritable, or hopeless Several days   Total Score PHQ 2 2       Abuse Screening Questionnaire 6/3/2021   Do you ever feel afraid of your partner? N   Are you in a relationship with someone who physically or mentally threatens you? N   Is it safe for you to go home?  Y       ADL Assessment 6/3/2021   Feeding yourself No Help Needed   Getting from bed to chair No Help Needed   Getting dressed No Help Needed   Bathing or showering No Help Needed   Walk across the room (includes cane/walker) No Help Needed   Using the telphone No Help Needed   Taking your medications No Help Needed   Preparing meals No Help Needed   Managing money (expenses/bills) No Help Needed   Moderately strenuous housework (laundry) No Help Needed   Shopping for personal items (toiletries/medicines) No Help Needed   Shopping for groceries No Help Needed   Driving No Help Needed   Climbing a flight of stairs No Help Needed   Getting to places beyond walking distances No Help Needed

## 2022-02-02 NOTE — PROGRESS NOTES
HISTORY OF PRESENT ILLNESS  Jose Briceño is a 55 y.o. male. Ankle Pain   The history is provided by the patient. This is a new problem. The current episode started more than 1 week ago. The problem occurs constantly. The problem has been gradually worsening. The pain is present in the right lower leg, right knee and right ankle. The quality of the pain is described as aching. The pain is severe. Associated symptoms include limited range of motion and stiffness. He has tried OTC pain medications for the symptoms. There has been no history of extremity trauma. No Hx of gout    Review of Systems   Musculoskeletal: Positive for stiffness. Social History     Socioeconomic History    Marital status:      Spouse name: Not on file    Number of children: 3    Years of education: Not on file    Highest education level: Not on file   Occupational History    Occupation: break shoes   Tobacco Use    Smoking status: Never Smoker    Smokeless tobacco: Never Used   Substance and Sexual Activity    Alcohol use: No     Alcohol/week: 0.0 standard drinks     Comment: Occasional    Drug use: No    Sexual activity: Yes     Partners: Female   Other Topics Concern    Not on file   Social History Narrative    Living with wife and 3 kids     Social Determinants of Health     Financial Resource Strain:     Difficulty of Paying Living Expenses: Not on file   Food Insecurity:     Worried About Running Out of Food in the Last Year: Not on file    Sherry of Food in the Last Year: Not on file   Transportation Needs:     Lack of Transportation (Medical): Not on file    Lack of Transportation (Non-Medical):  Not on file   Physical Activity:     Days of Exercise per Week: Not on file    Minutes of Exercise per Session: Not on file   Stress:     Feeling of Stress : Not on file   Social Connections:     Frequency of Communication with Friends and Family: Not on file    Frequency of Social Gatherings with Friends and Family: Not on file    Attends Confucianist Services: Not on file    Active Member of Clubs or Organizations: Not on file    Attends Club or Organization Meetings: Not on file    Marital Status: Not on file   Intimate Partner Violence:     Fear of Current or Ex-Partner: Not on file    Emotionally Abused: Not on file    Physically Abused: Not on file    Sexually Abused: Not on file   Housing Stability:     Unable to Pay for Housing in the Last Year: Not on file    Number of [place] in the Last Year: Not on file    Unstable Housing in the Last Year: Not on file       Physical Exam  Visit Vitals  /89   Pulse 69   Temp 98.8 °F (37.1 °C) (Temporal)   Resp 16   Ht 6' 1\" (1.854 m)   Wt 249 lb (112.9 kg)   SpO2 97%   BMI 32.85 kg/m²     Right knee grossly nl no efusion ant drawer test is neg  ASSESSMENT and PLAN  Encounter Diagnoses   Name Primary?     Arthritis of knee, right Yes     Orders Placed This Encounter    [place] K[place] [employer] [employer]    diclofenac (VOLTAREN) 1 % gel    cyclobenzaprine (FLEXERIL) 5 mg tablet     Go see ortho  F/up prn

## 2022-02-02 NOTE — LETTER
NOTIFICATION RETURN TO WORK     2/2/2022 4:17 PM    Mr. Radha Phillips  [facility] [address]      To Whom It May Concern:    Radha Phillips is currently under the care of [address]. He will return to work on Monday, February 21, 2022. If there are questions or concerns please have the patient contact our office.         Sincerely,      Koko Hampton MD

## 2022-02-14 DIAGNOSIS — M89.8X6 PAIN OF RIGHT TIBIA: ICD-10-CM

## 2022-02-14 DIAGNOSIS — M79.671 RIGHT FOOT PAIN: Primary | ICD-10-CM

## 2022-02-15 ENCOUNTER — HOSPITAL ENCOUNTER (OUTPATIENT)
Dept: GENERAL RADIOLOGY | Age: 57
Discharge: HOME | End: 2022-02-15
Payer: COMMERCIAL

## 2022-02-15 DIAGNOSIS — M89.8X6 PAIN OF RIGHT TIBIA: ICD-10-CM

## 2022-02-15 DIAGNOSIS — M79.671 RIGHT FOOT PAIN: ICD-10-CM

## 2022-02-15 PROCEDURE — 73630 X-RAY EXAM OF FOOT: CPT

## 2022-02-15 PROCEDURE — 73590 X-RAY EXAM OF LOWER LEG: CPT

## 2022-03-18 PROBLEM — K43.9 VENTRAL HERNIA WITHOUT OBSTRUCTION OR GANGRENE: Status: ACTIVE | Noted: 2018-05-13

## 2022-03-20 PROBLEM — M17.11 ARTHRITIS OF KNEE, RIGHT: Status: ACTIVE | Noted: 2021-03-01

## 2022-06-28 ENCOUNTER — OFFICE VISIT (OUTPATIENT)
Dept: INTERNAL MEDICINE CLINIC | Age: 57
End: 2022-06-28
Payer: COMMERCIAL

## 2022-06-28 VITALS
HEIGHT: 73 IN | DIASTOLIC BLOOD PRESSURE: 68 MMHG | WEIGHT: 246 LBS | SYSTOLIC BLOOD PRESSURE: 119 MMHG | OXYGEN SATURATION: 98 % | TEMPERATURE: 98 F | RESPIRATION RATE: 18 BRPM | HEART RATE: 86 BPM | BODY MASS INDEX: 32.6 KG/M2

## 2022-06-28 DIAGNOSIS — M17.11 ARTHRITIS OF KNEE, RIGHT: Primary | ICD-10-CM

## 2022-06-28 DIAGNOSIS — E78.2 MIXED HYPERLIPIDEMIA: ICD-10-CM

## 2022-06-28 DIAGNOSIS — M19.071 ARTHRITIS OF ANKLE, RIGHT: ICD-10-CM

## 2022-06-28 PROCEDURE — 99214 OFFICE O/P EST MOD 30 MIN: CPT | Performed by: FAMILY MEDICINE

## 2022-06-28 RX ORDER — HYDROCODONE BITARTRATE AND ACETAMINOPHEN 5; 325 MG/1; MG/1
1 TABLET ORAL
Qty: 12 TABLET | Refills: 0 | Status: SHIPPED | OUTPATIENT
Start: 2022-06-28 | End: 2022-07-02

## 2022-06-28 NOTE — PROGRESS NOTES
Identified pt with two pt identifiers(name and ). Reviewed record in preparation for visit and have obtained necessary documentation. Chief Complaint   Patient presents with    Knee Pain     bilat knee and ankle pain    Ankle Pain        Vitals:    22 1523   BP: 119/68   Pulse: 86   Resp: 18   Temp: 98 °F (36.7 °C)   TempSrc: Temporal   SpO2: 98%   Weight: 246 lb (111.6 kg)   Height: 6' 1\" (1.854 m)   PainSc:   0 - No pain       Health Maintenance Due   Topic    Hepatitis C Screening     COVID-19 Vaccine (1)    Colorectal Cancer Screening Combo     Shingrix Vaccine Age 50> (1 of 2)       1. \"Have you been to the ER, urgent care clinic since your last visit? Hospitalized since your last visit? \" Yes  cough    2. \"Have you seen or consulted any other health care providers outside of the [address] [place] [address] since your last visit? \" No     3. For patients over 45: Has the patient had a colonoscopy? No     If the patient is female:    4. For patients over 36: Has the patient had a mammogram? NA - based on age    11. For patients over 21: Has the patient had a pap smear? NA - based on age    Current Outpatient Medications   Medication Instructions    cyclobenzaprine (FLEXERIL) 5 mg, Oral, 3 TIMES DAILY AS NEEDED    diclofenac (VOLTAREN) 2 g, Topical, 4 TIMES DAILY       Allergies   Allergen Reactions    Tramadol Anaphylaxis    Ibuprofen Nausea Only    Keflex [Cephalexin] Itching    Morphine Hives    Pcn [Penicillins] Angioedema         There is no immunization history on file for this patient.     Past Medical History:   Diagnosis Date    Arthritis of knee, right 3/1/2021    Depression     Headache     Rotator cuff dysfunction

## 2022-06-28 NOTE — LETTER
NOTIFICATION RETURN TO WORK / SCHOOL    6/28/2022 4:07 PM    Mr. Jonathan Sanchez  [facility] [address]      To Whom It May Concern:    Jonathan Sanchez is currently under the care of [address]. He will return to work/school on: 3 July 2022    If there are questions or concerns please have the patient contact our office.         Sincerely,      Usha Dockery MD

## 2022-06-30 NOTE — PROGRESS NOTES
HISTORY OF PRESENT ILLNESS  Franklin Luna is a 65 y.o. male. Knee Pain  The history is provided by the patient. This is a chronic problem. The current episode started more than 1 week ago. The problem occurs constantly. The problem has been gradually worsening. Pertinent negatives include no chest pain and no shortness of breath. The symptoms are aggravated by walking (work). The symptoms are relieved by rest. He has tried acetaminophen (advil) for the symptoms. The treatment provided no relief. Ankle Pain   Associated symptoms include back pain. Also chronic in nature  He is going to apply for disability short-term so we can get this taken care of. Review of Systems   Respiratory: Negative for shortness of breath. Cardiovascular: Negative for chest pain. Musculoskeletal: Positive for back pain and joint pain. Negative for falls.      Patient Active Problem List   Diagnosis Code    Ventral hernia without obstruction or gangrene K43.9    Arthritis of knee, right M17.11     Past Surgical History:   Procedure Laterality Date    HX HERNIA REPAIR  05/2018       Social History     Socioeconomic History    Marital status:      Spouse name: Not on file    Number of children: 3    Years of education: Not on file    Highest education level: Not on file   Occupational History    Occupation: break shoes   Tobacco Use    Smoking status: Never Smoker    Smokeless tobacco: Never Used   Substance and Sexual Activity    Alcohol use: No     Alcohol/week: 0.0 standard drinks     Comment: Occasional    Drug use: No    Sexual activity: Yes     Partners: Female   Other Topics Concern    Not on file   Social History Narrative    Living with wife and 3 kids     Social Determinants of Health     Financial Resource Strain:     Difficulty of Paying Living Expenses: Not on file   Food Insecurity:     Worried About Running Out of Food in the Last Year: Not on file    Sherry of Food in the Last Year: Not on file   Transportation Needs:     Lack of Transportation (Medical): Not on file    Lack of Transportation (Non-Medical): Not on file   Physical Activity:     Days of Exercise per Week: Not on file    Minutes of Exercise per Session: Not on file   Stress:     Feeling of Stress : Not on file   Social Connections:     Frequency of Communication with Friends and Family: Not on file    Frequency of Social Gatherings with Friends and Family: Not on file    Attends Voodoo Services: Not on file    Active Member of [address] [place] [address] [employer] or Organizations: Not on file    Attends Club or Organization Meetings: Not on file    Marital Status: Not on file   Intimate Partner Violence:     Fear of Current or Ex-Partner: Not on file    Emotionally Abused: Not on file    Physically Abused: Not on file    Sexually Abused: Not on file   Housing Stability:     Unable to Pay for Housing in the Last Year: Not on file    Number of [place] in the Last Year: Not on file    Unstable Housing in the Last Year: Not on file       Physical Exam  Visit Vitals  /68 (BP 1 Location: Left upper arm, BP Patient Position: Sitting, BP Cuff Size: Adult)   Pulse 86   Temp 98 °F (36.7 °C) (Temporal)   Resp 18   Ht 6' 1\" (1.854 m)   Wt 246 lb (111.6 kg)   SpO2 98%   BMI 32.46 kg/m²     WD WN male NAD  Heart RRR without murmers clicks or rubs  Lungs CTA  Abdo soft nontender  Ext no edema, swelling or effusion, some reduced range of motion because of pain no redness    ASSESSMENT and PLAN  Encounter Diagnoses   Name Primary?  Arthritis of knee Yes    Arthritis of knee, right     Mixed hyperlipidemia      Orders Placed This Encounter    AMB SUPPLY ORDER    [facility]    HYDROcodone-acetaminophen (NORCO) 5-325 mg per tablet     Patient was looked up in the .  There are multiple prescribers of controlled substances  No  One-time only prescription we will get him into see orthopedics keep him out of work till evaluated    We discussed this pain and the usage of controlled substances for arthritis pain relief. In general these medications are indicated for the acute symptom relief and not for chronic usage, allthough they are frequently used for chronic management  After a certain period of time they should be stopped to avoid dependence and/or addiction. I warned him about the dangers of addiction and other side affects including respiratory depression and death. Discussed how this is a controlled substance and that the prescribing of it is should be monitored very carefully. Drug usage is also monitored by our practise and the [facility], since there has been a lot of abuse and diversion of controlled substances. In general we do not refill this medication over the phone. PLease ask for enough pills to get you to your next appointment and make sure you keep your appointments. Warned not to take other medications like alcohol, other benzodiazapines marijuana or other narcotics when on this medication. Follow-up and Dispositions    · Return if symptoms worsen or fail to improve. Marquis De La Rosa

## 2022-10-14 ENCOUNTER — NURSE TRIAGE (OUTPATIENT)
Dept: OTHER | Facility: CLINIC | Age: 57
End: 2022-10-14

## 2022-10-14 ENCOUNTER — VIRTUAL VISIT (OUTPATIENT)
Dept: INTERNAL MEDICINE CLINIC | Age: 57
End: 2022-10-14
Payer: COMMERCIAL

## 2022-10-14 DIAGNOSIS — S99.911A INJURY, ANKLE, RIGHT, INITIAL ENCOUNTER: ICD-10-CM

## 2022-10-14 DIAGNOSIS — S39.92XD INJURY OF BACK, SUBSEQUENT ENCOUNTER: ICD-10-CM

## 2022-10-14 DIAGNOSIS — U07.1 COVID-19: ICD-10-CM

## 2022-10-14 DIAGNOSIS — S89.91XS: Primary | ICD-10-CM

## 2022-10-14 PROCEDURE — 99442 PR PHYS/QHP TELEPHONE EVALUATION 11-20 MIN: CPT | Performed by: FAMILY MEDICINE

## 2022-10-14 RX ORDER — NAPROXEN 500 MG/1
500 TABLET ORAL 2 TIMES DAILY WITH MEALS
Qty: 40 TABLET | Refills: 0 | Status: SHIPPED | OUTPATIENT
Start: 2022-10-14

## 2022-10-14 NOTE — LETTER
10/14/2022 4:00 PM    Mr. Kayla Zavala  [address]        Dear Mr. Demian Anthony:    Doyle Bowers you will find your order for multiple XRs.       Sincerely,      Urmila Casillas MD

## 2022-10-14 NOTE — PROGRESS NOTES
Chief Complaint   Patient presents with    Leg Swelling     Pt fell, leg swollen and foot     Patient is aware that this is a Virtual Visit or Phone Call Only doctor's visit. Patient has not been out of the country in (14 months), NO diarrhea, NO cough, NO chest conjestion, NO temp. Pt has not been around anyone with these symptoms. Health Maintenance reviewed. I have reviewed the patient's medical history in detail and updated the computerized patient record. Have you been to the ER, urgent care clinic since your last visit? No Hospitalized since your last visit? No     2. Have you seen or consulted any other health care providers outside of the [address] [place] [address] since your last visit? No  Include any pap smears or colon screening. Encouraged pt to discuss pt's wishes with spouse/partner/family and bring them in the next appt to follow thru with the Advanced Directive      Fall Risk Assessment, last 12 mths 3/1/2021   Able to walk? Yes   Fall in past 12 months? 0   Do you feel unsteady? 0   Are you worried about falling 0       3 most recent PHQ Screens 6/28/2022   Little interest or pleasure in doing things Not at all   Feeling down, depressed, irritable, or hopeless Not at all   Total Score PHQ 2 0       Abuse Screening Questionnaire 6/3/2021   Do you ever feel afraid of your partner? N   Are you in a relationship with someone who physically or mentally threatens you? N   Is it safe for you to go home?  Y       ADL Assessment 6/3/2021   Feeding yourself No Help Needed   Getting from bed to chair No Help Needed   Getting dressed No Help Needed   Bathing or showering No Help Needed   Walk across the room (includes cane/walker) No Help Needed   Using the telphone No Help Needed   Taking your medications No Help Needed   Preparing meals No Help Needed   Managing money (expenses/bills) No Help Needed   Moderately strenuous housework (laundry) No Help Needed   Shopping for personal items (toiletries/medicines) No Help Needed   Shopping for groceries No Help Needed   Driving No Help Needed   Climbing a flight of stairs No Help Needed   Getting to places beyond walking distances No Help Needed

## 2022-10-14 NOTE — TELEPHONE ENCOUNTER
Location of patient: [place]    Received call from Lizbeth Martin at [facility] with [employer]. Subjective: Caller states \"My leg and my ankle keep swelling. I fell down my steps. I missed a step and fell down on my leg\"       Current Symptoms: Right leg swelling and pain, swelling to knee and ankle    Onset: 2 days ago;     Denies - head or neck injury / bruising / discoloration       Pain Severity: 9/10; Temperature: denies     What has been tried: tylenol      Recommended disposition: See in Office Today    Care advice provided, patient verbalizes understanding; denies any other questions or concerns; instructed to call back for any new or worsening symptoms. Patient/Caller agrees with recommended disposition; writer provided warm transfer to Wendy Mills at [facility] for appointment scheduling    Attention Provider: Thank you for allowing me to participate in the care of your patient. The patient was connected to triage in response to information provided to the [facility]. Please do not respond through this encounter as the response is not directed to a shared pool.         Reason for Disposition   Patient wants to be seen    Protocols used: Leg Injury-ADULT-[place]

## 2022-10-14 NOTE — PROGRESS NOTES
HISTORY OF PRESENT ILLNESS  Vicky Barrientos is a 65 y.o. male. Leg Swelling  The history is provided by the Patient. This is a new problem. The current episode started 2 days ago. The problem occurs constantly. The problem has been gradually worsening. Pertinent negatives include no shortness of breath. Associated symptoms comments: Right knee right ankle and low back. Hurts after he fell. Went to work had a fever  Sent to  Dx COVID +    Review of Systems   Constitutional:  Positive for fever. Respiratory:  Positive for cough. Negative for shortness of breath.         + COVID Dx MOn   No Rx? Social History     Socioeconomic History    Marital status:      Spouse name: Not on file    Number of children: 3    Years of education: Not on file    Highest education level: Not on file   Occupational History    Occupation: break shoes   Tobacco Use    Smoking status: Never    Smokeless tobacco: Never   Substance and Sexual Activity    Alcohol use: No     Alcohol/week: 0.0 standard drinks     Comment: Occasional    Drug use: No    Sexual activity: Yes     Partners: Female   Other Topics Concern    Not on file   Social History Narrative    Living with wife and 3 kids     Social Determinants of Health     Financial Resource Strain: Not on file   Food Insecurity: Not on file   Transportation Needs: Not on file   Physical Activity: Not on file   Stress: Not on file   Social Connections: Not on file   Intimate Partner Violence: Not on file   Housing Stability: Not on file       Physical Exam  No PE    ASSESSMENT and PLAN  Encounter Diagnoses   Name Primary?     Injury, knee, right, sequela Yes    Injury, ankle, right, initial encounter     Injury of back, subsequent encounter     COVID-19      Orders Placed This Encounter    XR KNEE RT MAX 2 VWS     Standing Status:   Future     Standing Expiration Date:   11/14/2023     Order Specific Question:   Reason for Exam     Answer:   knee pain after injury    XR ANKLE RT MIN 3 V     Standing Status:   Future     Standing Expiration Date:   11/14/2023    XR SPINE LUMB 2 OR 3 V     Standing Status:   Future     Standing Expiration Date:   11/14/2023     Order Specific Question:   Reason for Exam     Answer:   back injury    nirmatrelvir-ritonavir (PAXLOVID) 300 mg (150 mg x 2)-100 mg     Sig: Take 3 Tablets by mouth every twelve (12) hours for 5 days. Dispense:  1 Box     Refill:  0     Order Specific Question:   Does this patient qualify for COVID-19 antiviral treatment based on criteria for treatment? Answer:   Yes    naproxen (NAPROSYN) 500 mg tablet     Sig: Take 1 Tablet by mouth two (2) times daily (with meals). As needed for pain     Dispense:  40 Tablet     Refill:  0     F/up prn  Eleno Patrick is a 65 y.o. male who was phone evaluated on 10/14/2022. Consent:  He and/or his healthcare decision maker is aware that this patient-initiated Telehealth encounter is a billable service, with coverage as determined by his insurance carrier. He is aware that he may receive a bill and has provided verbal consent to proceed: Yes    I was in the office while conducting this encounter. Assessment & Plan:   Diagnoses and all orders for this visit:    1. Injury, knee, right, sequela  -     XR KNEE RT MAX 2 VWS; Future  -     naproxen (NAPROSYN) 500 mg tablet; Take 1 Tablet by mouth two (2) times daily (with meals). As needed for pain    2. Injury, ankle, right, initial encounter  -     XR ANKLE RT MIN 3 V; Future  -     naproxen (NAPROSYN) 500 mg tablet; Take 1 Tablet by mouth two (2) times daily (with meals). As needed for pain    3. Injury of back, subsequent encounter  -     XR SPINE LUMB 2 OR 3 V; Future  -     naproxen (NAPROSYN) 500 mg tablet; Take 1 Tablet by mouth two (2) times daily (with meals). As needed for pain    4. COVID-19  -     nirmatrelvir-ritonavir (PAXLOVID) 300 mg (150 mg x 2)-100 mg;  Take 3 Tablets by mouth every twelve (12) hours for 5 days.            712  Subjective:      11 min      We discussed the expected course, resolution and complications of the diagnosis(es) in detail. Medication risks, benefits, costs, interactions, and alternatives were discussed as indicated. I advised him to contact the office if his condition worsens, changes or fails to improve as anticipated. He expressed understanding with the diagnosis(es) and plan. Pursuant to the emergency declaration under the [address] [place] [address] waiver authority and the [employer] and [employer] Act, this Virtual  Visit was conducted, with patient's consent, to reduce the patient's risk of exposure to COVID-19 and provide continuity of care for an established patient. Services were provided through a video synchronous discussion virtually to substitute for in-person clinic visit.     Syed Medeiros MD

## 2024-02-27 NOTE — DISCHARGE INSTRUCTIONS

## 2025-08-02 ENCOUNTER — APPOINTMENT (OUTPATIENT)
Facility: HOSPITAL | Age: 60
End: 2025-08-02
Payer: MEDICAID

## 2025-08-02 ENCOUNTER — HOSPITAL ENCOUNTER (EMERGENCY)
Facility: HOSPITAL | Age: 60
Discharge: HOME | End: 2025-08-02
Attending: EMERGENCY MEDICINE
Payer: MEDICAID

## 2025-08-02 VITALS
DIASTOLIC BLOOD PRESSURE: 95 MMHG | HEART RATE: 64 BPM | SYSTOLIC BLOOD PRESSURE: 164 MMHG | TEMPERATURE: 98.2 F | BODY MASS INDEX: 32.08 KG/M2 | RESPIRATION RATE: 16 BRPM | HEIGHT: 74 IN | OXYGEN SATURATION: 100 % | WEIGHT: 250 LBS

## 2025-08-02 DIAGNOSIS — M17.11 PRIMARY OSTEOARTHRITIS OF RIGHT KNEE: Primary | ICD-10-CM

## 2025-08-02 LAB — ECHO BSA: 2.43 M2

## 2025-08-02 PROCEDURE — 73562 X-RAY EXAM OF KNEE 3: CPT

## 2025-08-02 PROCEDURE — 6370000000 HC RX 637 (ALT 250 FOR IP): Performed by: EMERGENCY MEDICINE

## 2025-08-02 PROCEDURE — 99284 EMERGENCY DEPT VISIT MOD MDM: CPT

## 2025-08-02 PROCEDURE — 93971 EXTREMITY STUDY: CPT

## 2025-08-02 RX ORDER — ACETAMINOPHEN 500 MG
1000 TABLET ORAL 3 TIMES DAILY PRN
Qty: 84 TABLET | Refills: 0 | Status: SHIPPED | OUTPATIENT
Start: 2025-08-02 | End: 2025-08-16

## 2025-08-02 RX ORDER — NAPROXEN 500 MG/1
500 TABLET ORAL 2 TIMES DAILY PRN
Qty: 20 TABLET | Refills: 0 | Status: SHIPPED | OUTPATIENT
Start: 2025-08-02 | End: 2025-08-12

## 2025-08-02 RX ORDER — ACETAMINOPHEN 500 MG
1000 TABLET ORAL
Status: COMPLETED | OUTPATIENT
Start: 2025-08-02 | End: 2025-08-02

## 2025-08-02 RX ORDER — PREDNISONE 10 MG/1
TABLET ORAL
Qty: 30 TABLET | Refills: 0 | Status: SHIPPED | OUTPATIENT
Start: 2025-08-02 | End: 2025-08-14

## 2025-08-02 RX ADMIN — ACETAMINOPHEN 1000 MG: 500 TABLET ORAL at 16:08

## 2025-08-02 ASSESSMENT — LIFESTYLE VARIABLES
HOW MANY STANDARD DRINKS CONTAINING ALCOHOL DO YOU HAVE ON A TYPICAL DAY: PATIENT DOES NOT DRINK
HOW OFTEN DO YOU HAVE A DRINK CONTAINING ALCOHOL: NEVER

## 2025-08-02 ASSESSMENT — PAIN SCALES - GENERAL: PAINLEVEL_OUTOF10: 9

## 2025-08-02 ASSESSMENT — PAIN - FUNCTIONAL ASSESSMENT
PAIN_FUNCTIONAL_ASSESSMENT: 0-10
PAIN_FUNCTIONAL_ASSESSMENT: ACTIVITIES ARE NOT PREVENTED

## 2025-08-02 ASSESSMENT — PAIN DESCRIPTION - LOCATION: LOCATION: LEG

## 2025-08-02 ASSESSMENT — PAIN DESCRIPTION - ORIENTATION: ORIENTATION: RIGHT

## 2025-08-02 NOTE — ED NOTES
Discharge medications reviewed with the patient and family and appropriate educational materials and side effects teaching were provided.

## 2025-08-02 NOTE — ED PROVIDER NOTES
medications:  Medications   acetaminophen (TYLENOL) tablet 1,000 mg (1,000 mg Oral Given 8/2/25 160)       Medical Decision Making  60-year-old presenting with right leg pain which on exam localizes predominantly to the right knee documented history of right knee arthritis    Will obtain x-rays of right knee to assess for acute bony abnormality albeit low suspicion.  Will do obtain duplex of the right leg.  Otherwise leg is well-vascularized, no new focal deficit, no skin change or suspicion of acute soft tissue infection.  Very low suspicion of septic arthritis given reassuring exam without palpable effusion minimal pain with range of motion    Amount and/or Complexity of Data Reviewed  Radiology: ordered and independent interpretation performed. Decision-making details documented in ED Course.    Risk  OTC drugs.  Prescription drug management.      ED Course as of 08/02/25 1732   Sat Aug 02, 2025   1652 There is no DVT [WB]   1724 X-ray of right knee independently reviewed by myself shows severe joint narrowing [WB]      ED Course User Index  [WB] John Raymundo MD         FINAL IMPRESSION     1. Primary osteoarthritis of right knee          DISPOSITION/PLAN   Elier Sommer's  results have been reviewed with him.  He has been counseled regarding his diagnosis, treatment, and plan.  He verbally conveys understanding and agreement of the signs, symptoms, diagnosis, treatment and prognosis and additionally agrees to follow up as discussed.  He also agrees with the care-plan and conveys that all of his questions have been answered.  I have also provided discharge instructions for him that include: educational information regarding their diagnosis and treatment, and list of reasons why they would want to return to the ED prior to their follow-up appointment, should his condition change.     CLINICAL IMPRESSION    Discharge Note: The patient is stable for discharge home. The signs, symptoms, diagnosis, and  proofreading.)         John Raymundo MD  08/02/25 0405

## 2025-08-02 NOTE — DISCHARGE INSTRUCTIONS
Thank You!    It was a pleasure taking care of you in our Emergency Department today. We know that when you come to our Emergency Department, you are entrusting us with your health, comfort, and safety. Our physicians and nurses honor that trust, and truly appreciate the opportunity to care for you and your loved ones.      We also value your feedback. If you receive a survey about your Emergency Department experience today, please fill it out.  We care about our patients' feedback, and we listen to what you have to say.  Thank you.    John Raymundo MD  ________________________________________________________________________  I have included a copy of your lab results and/or radiologic studies from today's visit so you can have them easily available at your follow-up visit. We hope you feel better and please do not hesitate to contact the ED if you have any questions at all!    Recent Results (from the past 12 hours)   Vascular duplex lower extremity venous right    Collection Time: 08/02/25  4:49 PM   Result Value Ref Range    Body Surface Area 2.43 m2     Vascular duplex lower extremity venous right   Final Result      XR KNEE RIGHT (3 VIEWS)   Final Result   Osteoarthrosis with severe lateral compartment joint space   narrowing.      Electronically signed by Chapito Remy MD          The exam and treatment you received in the Emergency Department were for an urgent problem and are not intended as complete care. It is important that you follow up with a doctor, nurse practitioner, or physician assistant for ongoing care. If your symptoms become worse or you do not improve as expected and you are unable to reach your usual health care provider, you should return to the Emergency Department. We are available 24 hours a day.    Please take your discharge instructions with you when you go to your follow-up appointment.     If a prescription has been provided, please have it filled as soon as possible to prevent a  delay in treatment. Read the entire medication instruction sheet provided to you by the pharmacy. If you have any questions or reservations about taking the medication due to side effects or interactions with other medications, please call your primary care physician or contact the ER to speak with the charge nurse.     Please make an appointment with your family doctor or the physician you were referred to for follow-up of this visit as instructed on your discharge paperwork. Return to the ER if you are unable to be seen or if you are unable to be seen in a timely manner.    Should you experience abdominal pain lasting greater than 6 hours, chest pain, difficulty breathing, fever/chills, numbness/tingling, skin changes or other symptoms that concern you, return to the ED sooner. If you feel worse over the next 24 hours, please return to the ED. We are available 24 hours a day. Thank you for trusting us with your care!